# Patient Record
Sex: MALE | Race: WHITE | NOT HISPANIC OR LATINO | Employment: FULL TIME | URBAN - METROPOLITAN AREA
[De-identification: names, ages, dates, MRNs, and addresses within clinical notes are randomized per-mention and may not be internally consistent; named-entity substitution may affect disease eponyms.]

---

## 2019-07-10 ENCOUNTER — OFFICE VISIT (OUTPATIENT)
Dept: PODIATRY | Facility: CLINIC | Age: 49
End: 2019-07-10
Payer: COMMERCIAL

## 2019-07-10 VITALS
HEIGHT: 67 IN | WEIGHT: 181 LBS | SYSTOLIC BLOOD PRESSURE: 130 MMHG | BODY MASS INDEX: 28.41 KG/M2 | HEART RATE: 69 BPM | DIASTOLIC BLOOD PRESSURE: 84 MMHG

## 2019-07-10 DIAGNOSIS — D36.10 NEUROMA: Primary | ICD-10-CM

## 2019-07-10 DIAGNOSIS — M79.672 LEFT FOOT PAIN: ICD-10-CM

## 2019-07-10 PROCEDURE — 99213 PR OFFICE/OUTPT VISIT, EST, LEVL III, 20-29 MIN: ICD-10-PCS | Mod: 25,,, | Performed by: PODIATRIST

## 2019-07-10 PROCEDURE — 64455 NJX AA&/STRD PLTR COM DG NRV: CPT | Mod: LT,,, | Performed by: PODIATRIST

## 2019-07-10 PROCEDURE — 99213 OFFICE O/P EST LOW 20 MIN: CPT | Mod: 25,,, | Performed by: PODIATRIST

## 2019-07-10 PROCEDURE — 64455 PR INJECT ANES/STEROID PLANTAR COMMON DIGITAL NERVE: ICD-10-PCS | Mod: LT,,, | Performed by: PODIATRIST

## 2019-07-10 RX ORDER — DEXAMETHASONE SODIUM PHOSPHATE 4 MG/ML
4 INJECTION, SOLUTION INTRA-ARTICULAR; INTRALESIONAL; INTRAMUSCULAR; INTRAVENOUS; SOFT TISSUE
Status: CANCELLED | OUTPATIENT
Start: 2019-07-10 | End: 2019-07-10

## 2019-07-10 RX ORDER — BUPIVACAINE HYDROCHLORIDE 5 MG/ML
1.5 INJECTION, SOLUTION PERINEURAL
Status: COMPLETED | OUTPATIENT
Start: 2019-07-10 | End: 2019-07-10

## 2019-07-10 RX ORDER — NAPROXEN 500 MG/1
500 TABLET ORAL 2 TIMES DAILY
Qty: 60 TABLET | Refills: 1 | Status: SHIPPED | OUTPATIENT
Start: 2019-07-10 | End: 2019-09-08

## 2019-07-10 RX ORDER — BUPIVACAINE HYDROCHLORIDE 5 MG/ML
1.5 INJECTION, SOLUTION PERINEURAL
Status: CANCELLED | OUTPATIENT
Start: 2019-07-10 | End: 2019-07-10

## 2019-07-10 RX ORDER — DEXAMETHASONE SODIUM PHOSPHATE 4 MG/ML
4 INJECTION, SOLUTION INTRA-ARTICULAR; INTRALESIONAL; INTRAMUSCULAR; INTRAVENOUS; SOFT TISSUE
Status: COMPLETED | OUTPATIENT
Start: 2019-07-10 | End: 2019-07-10

## 2019-07-10 RX ORDER — METHYLPREDNISOLONE ACETATE 40 MG/ML
20 INJECTION, SUSPENSION INTRA-ARTICULAR; INTRALESIONAL; INTRAMUSCULAR; SOFT TISSUE
Status: COMPLETED | OUTPATIENT
Start: 2019-07-10 | End: 2019-07-10

## 2019-07-10 RX ORDER — METHYLPREDNISOLONE ACETATE 40 MG/ML
20 INJECTION, SUSPENSION INTRA-ARTICULAR; INTRALESIONAL; INTRAMUSCULAR; SOFT TISSUE
Status: CANCELLED | OUTPATIENT
Start: 2019-07-10 | End: 2019-07-10

## 2019-07-10 RX ADMIN — METHYLPREDNISOLONE ACETATE 20 MG: 40 INJECTION, SUSPENSION INTRA-ARTICULAR; INTRALESIONAL; INTRAMUSCULAR; SOFT TISSUE at 03:07

## 2019-07-10 RX ADMIN — DEXAMETHASONE SODIUM PHOSPHATE 4 MG: 4 INJECTION, SOLUTION INTRA-ARTICULAR; INTRALESIONAL; INTRAMUSCULAR; INTRAVENOUS; SOFT TISSUE at 03:07

## 2019-07-10 RX ADMIN — BUPIVACAINE HYDROCHLORIDE 7.5 MG: 5 INJECTION, SOLUTION PERINEURAL at 03:07

## 2019-07-10 NOTE — PROGRESS NOTES
"  [address] [facility] [address] [place] [address]  Phone: [phone]   [phone]    Patient's PCP:Jeyson Blount MD  Referring Provider: No ref. provider found    Subjective:      Chief Complaint:: No chief complaint on file.    HPI  Cory Musa is a 49 y.o. male who presents with a follow up for neuroma second IS left foot. Doing a little better.I have been icing it.Three weeks ago it was really bad but over the weekend it did better.Would like an injection. "I am starting a new job Monday and I will be on my feet a lot."    Vitals:    07/10/19 1505   BP: 130/84   Pulse: 69     Shoe Size:     Past Surgical History:   Procedure Laterality Date    FOOT SURGERY      left  excision mariscal's neuroma     Past Medical History:   Diagnosis Date    GERD (gastroesophageal reflux disease)     Hernia of unspecified site of abdominal cavity without mention of obstruction or gangrene     hiatal    Insomnia     Mariscal's neuroma      Family History   Problem Relation Age of Onset    Celiac disease Neg Hx     Cirrhosis Neg Hx     Colon cancer Neg Hx     Colon polyps Neg Hx     Crohn's disease Neg Hx     Cystic fibrosis Neg Hx     Esophageal cancer Neg Hx     Hemochromatosis Neg Hx     Inflammatory bowel disease Neg Hx     Irritable bowel syndrome Neg Hx     Liver cancer Neg Hx     Liver disease Neg Hx     Rectal cancer Neg Hx     Stomach cancer Neg Hx     Ulcerative colitis Neg Hx     Herminio's disease Neg Hx     Heart disease Brother 45        CAD        Social History:   Marital Status:   Alcohol History:  reports that he drinks about 0.6 oz of alcohol per week.  Tobacco History:  reports that he has never smoked. He does not have any smokeless tobacco history on file.  Drug History:  has no drug history on file.    Review of patient's allergies indicates:  No Known Allergies    Current Outpatient Medications   Medication Sig Dispense Refill    naproxen (NAPROSYN) 500 MG tablet Take 1 " tablet (500 mg total) by mouth 2 (two) times daily. 60 tablet 1    omeprazole (PRILOSEC) 40 MG capsule Take 1 capsule (40 mg total) by mouth every morning. 30 capsule 11     Current Facility-Administered Medications   Medication Dose Route Frequency Provider Last Rate Last Dose    bupivacaine 0.5% (5 mg/ml) injection 7.5 mg  1.5 mL Infiltration 1 time in Clinic/HOD Jagdeep Bustamante DPM        dexamethasone injection 4 mg  4 mg Other 1 time in Clinic/HOD Jagdeep Bustamante DPM        methylPREDNISolone acetate injection 20 mg  20 mg Other 1 time in Clinic/HOD Jagdeep Bustamante DPM           Review of Systems      Objective:        Physical Exam:   Foot Exam    General  General Appearance: appears stated age and healthy   Orientation: alert and oriented to person, place, and time   Affect: appropriate   Gait: unimpaired       Left Foot/Ankle      Inspection and Palpation  Ecchymosis: none  Tenderness: (2nd intermetatarsal space )  Swelling: none   Arch: normal  Hammertoes: absent  Claw toes: absent  Hallux valgus: no  Hallux limitus: no  Skin Exam: skin intact;     Neurovascular  Dorsalis pedis: 2+  Posterior tibial: 2+  Saphenous nerve sensation: normal  Tibial nerve sensation: normal  Superficial peroneal nerve sensation: normal  Deep peroneal nerve sensation: normal  Sural nerve sensation: normal    Muscle Strength  Ankle dorsiflexion: 5  Ankle plantar flexion: 5  Ankle inversion: 5  Ankle eversion: 5  Great toe extension: 5  Great toe flexion: 5    Comments  Tenderness with forefoot compression     Physical Exam   Cardiovascular:   Pulses:       Dorsalis pedis pulses are 2+ on the left side.        Posterior tibial pulses are 2+ on the left side.       Imaging: none            Assessment:       1. Neuroma    2. Left foot pain      Plan:   Neuroma  -     naproxen (NAPROSYN) 500 MG tablet; Take 1 tablet (500 mg total) by mouth 2 (two) times daily.  Dispense: 60 tablet; Refill: 1  -     methylPREDNISolone acetate injection  20 mg  -     bupivacaine 0.5% (5 mg/ml) injection 7.5 mg  -     dexamethasone injection 4 mg    Left foot pain  -     naproxen (NAPROSYN) 500 MG tablet; Take 1 tablet (500 mg total) by mouth 2 (two) times daily.  Dispense: 60 tablet; Refill: 1  -     methylPREDNISolone acetate injection 20 mg  -     bupivacaine 0.5% (5 mg/ml) injection 7.5 mg  -     dexamethasone injection 4 mg      Follow up if symptoms worsen or fail to improve.    Procedures - The area was prepped with alcohol, and a steroid injection was performed at left second intermetatarsal space using 1 1/2 cc of 0.5% Marcaine w/out epi, 1 cc Dexamethasone, 1/2 cc Methylprednisolone. Patient tolerated the procedure well.       Avoid barefoot  Recommend stiffer-soled shoes with more room in the toe box area  Ice to painful area 15 minutes at a time       Counseling:     I provided patient education verbally regarding:   Patient diagnosis, treatment options, as well as alternatives, risks, and benefits.     I counseled patient on causes and treatments for neuromas. Wearing tight or high-heeled shoes can cause a neuroma. Shoes that are too narrow or too pointed squeeze the bones in the ball of the foot. Shoes with high heels put extra pressure on the ends of the bones. When the bones are squeezed together, they pinch the nerve that runs between them. Taking off your shoes and rubbing the ball of your foot may decrease or relieve the pain. Recommend shoes with more room in the toe box. Sometimes steroid injection is necessary to alleviate symptoms. Discussed alcohol sclerosing injections as another option for conservative treatment.       This note was created using Dragon voice recognition software that occasionally misinterpreted phrases or words.

## 2019-07-10 NOTE — PATIENT INSTRUCTIONS
What Are Neuromas of the Foot?  The ball of your foot is the bottom part just behind your toes. Bands of tissue (ligaments) connect the bones in the ball of your foot. Nerves run between the bones and underneath the ligaments. When a nerve becomes pinched, this causes it to swell and become painful due to the thickening of the tissue that surrounds the nerve. The painful, swollen nerve is called a neuroma (also called Woodson's neuroma).     A neuroma most often occurs at the base of either the third and fourth toes or the second and third toes.   What causes a neuroma?  Wearing tight or high-heeled shoes can cause a neuroma. Shoes that are too narrow or too pointed squeeze the bones in the ball of the foot. Shoes with high heels put extra pressure on the ends of the bones. When the bones are squeezed together, they pinch the nerve that runs between them.  Symptoms  The most common symptom of a neuroma is pain in the ball of the foot between two toes. The pain may be dull or sharp. It may feel as if you have a stone in your shoe. You may also have tingling or numbness in one or both of the toes. Symptoms may occur after you have been walking or standing for a while. Taking off your shoes and rubbing the ball of your foot may decrease or relieve the pain.  Preventing future problems  To prevent a future neuroma, buy shoes with plenty of room across the ball of the foot and in the toes. This keeps the bones from being squeezed together. Wearing low-heeled shoes (less than 2 inches) also puts less pressure on the bones and nerves in the ball of the foot.   Date Last Reviewed: 9/10/2015  © 0649-0119 [employer]. [address] [place] [address], [place] [address]. All rights reserved. This information is not intended as a substitute for professional medical care. Always follow your healthcare professional's instructions.

## 2020-03-18 ENCOUNTER — HOSPITAL ENCOUNTER (OUTPATIENT)
Dept: RADIOLOGY | Facility: CLINIC | Age: 50
Discharge: HOME | End: 2020-03-18
Attending: PODIATRIST
Payer: COMMERCIAL

## 2020-03-18 ENCOUNTER — OFFICE VISIT (OUTPATIENT)
Dept: PODIATRY | Facility: CLINIC | Age: 50
End: 2020-03-18
Payer: COMMERCIAL

## 2020-03-18 VITALS — BODY MASS INDEX: 26.68 KG/M2 | HEART RATE: 86 BPM | RESPIRATION RATE: 18 BRPM | HEIGHT: 67 IN | WEIGHT: 170 LBS

## 2020-03-18 DIAGNOSIS — M76.71 PERONEAL TENDINITIS OF RIGHT LOWER EXTREMITY: Primary | ICD-10-CM

## 2020-03-18 DIAGNOSIS — M79.671 RIGHT FOOT PAIN: ICD-10-CM

## 2020-03-18 PROCEDURE — 73630 XR FOOT COMPLETE 3 VIEW RIGHT: ICD-10-PCS | Mod: RT,S$GLB,, | Performed by: PODIATRIST

## 2020-03-18 PROCEDURE — 73630 X-RAY EXAM OF FOOT: CPT | Mod: RT,S$GLB,, | Performed by: PODIATRIST

## 2020-03-18 PROCEDURE — 99213 PR OFFICE/OUTPT VISIT, EST, LEVL III, 20-29 MIN: ICD-10-PCS | Mod: 25,S$GLB,, | Performed by: PODIATRIST

## 2020-03-18 PROCEDURE — 99213 OFFICE O/P EST LOW 20 MIN: CPT | Mod: 25,S$GLB,, | Performed by: PODIATRIST

## 2020-03-18 RX ORDER — IBUPROFEN 800 MG/1
800 TABLET ORAL EVERY 8 HOURS PRN
COMMUNITY
Start: 2020-03-17 | End: 2020-10-05

## 2020-03-18 RX ORDER — NAPROXEN 500 MG/1
1 TABLET ORAL
COMMUNITY
Start: 2019-07-10 | End: 2020-07-01

## 2020-03-18 RX ORDER — METHYLPREDNISOLONE 4 MG/1
TABLET ORAL
Qty: 1 PACKAGE | Refills: 0 | Status: SHIPPED | OUTPATIENT
Start: 2020-03-18 | End: 2020-04-17

## 2020-03-18 NOTE — LETTER
March 18, 2020      [facility] - Podiatry  [address]  [facility] [address]  Phone: [phone]  Fax: [phone]       Patient: Cory Dkueluis   YOB: 1970  Date of Visit: 03/18/2020    To Whom It May Concern:    [facility]iz [facility] peroneal tendinitis of lower right extremity. He may return to work on 08/24/2020 with restrictions of cam walker boot cast. If you have any questions or concerns, or if I can be of further assistance, please do not hesitate to contact me.    Sincerely,    Electronically Signed by: RAEGAN Servin DPM

## 2020-03-18 NOTE — PATIENT INSTRUCTIONS
Tendonitis  A tendon is the thick fibrous cord that joins muscle to bone and allows joints to move. When a tendon becomes inflamed, it is called tendonitis. This can occur from overuse, injury, or infection. This usually involves the shoulders, forearm, wrist, hands and foot. Symptoms include pain, swelling and tenderness to the touch. Moving the joint increases the pain.  It takes 4 to 6 weeks for tendonitis to heal. It is treated by preventing motion of the tendon with a splint or brace and the use of anti-inflammatory medicine.  Home care  · Some people find relief with ice packs. These can be crushed or cubed ice in a plastic bag or a bag of frozen vegetables wrapped in a thin towel. Other people get better relief with heat. This can include a hot shower, hot bath, or a moist towel warmed in a microwave. Try each and use the method that feels best, for 15 to 20 minutes several times a day.  · Rest the inflamed joint and protect it from movement.  · You may use over-the-counter ibuprofen or naproxen to treat pain and inflammation, unless another medicine was prescribed. If you can't take these medicines, acetaminophen may help with the pain, but does not treat inflammation. If you have chronic liver or kidney disease or ever had a stomach ulcer or gastrointestinal bleeding, talk with your doctor before using these medicines.  · As your symptoms improve, begin gradual motion at the involved joint.  Follow-up care  Follow up with your healthcare provider if you are not improving after 5 days of treatment.  When to seek medical advice  Call your healthcare provider right away if any of these occur:  · Redness over the painful area  · Increasing pain or swelling at the joint  · Fever (1 degree above your normal temperature) lasting 24 to 48 hours Or, whatever your healthcare provider told you to report based on your condition  Date Last Reviewed: 11/21/2015  © 8982-2760 [employer]. [address] [place] [address]  Road, [place] [address]. All rights reserved. This information is not intended as a substitute for professional medical care. Always follow your healthcare professional's instructions.

## 2020-03-18 NOTE — LETTER
March 18, 2020      [facility] - Podiatry  [address] [facility] [address]  [facility] [address]  Phone: [phone]  Fax: [phone]       Patient: Cory Dukeluis   YOB: 1970  Date of Visit: 03/18/2020    To Whom It May Concern:    Luiz Musa  [facility] for peroneal tendinitis of lower right extremity. He may return to workl on 03/30/2020 with restrictions of cam walker boot cast. If you have any questions or concerns, or if I can be of further assistance, please do not hesitate to contact me.    Sincerely,    Electronically Signed by: RAEGAN Servin DPM

## 2020-03-18 NOTE — PROGRESS NOTES
[address] [facility] [address]  [place] [address]  Phone: [phone]   [phone]    Patient's PCP:Jeyson Blount MD  Referring Provider: No ref. provider found    Subjective:      Chief Complaint:: Foot Pain (right lateral aspect)    HPI  Cory Musa is a 50 y.o. male who presents with a complaint of pain and new lump to lateral aspect of right foot lasting for three days. Onset of the symptoms was inability to weight bear without sharp pain.  Current symptoms include throbbing pain at night sharp pain when walking of weight bearing, lump on outside of foot..  Aggravating factors are walking, weight bearing. Symptoms have remained.Treatment to date have included ice, elevation, ibuprofen. Patients rates pain 8/10 on pain scale.      Vitals:    03/18/20 1012   Pulse: 86   Resp: 18     Shoe Size: 9.5    Past Surgical History:   Procedure Laterality Date    FOOT SURGERY      left  excision mariscal's neuroma     Past Medical History:   Diagnosis Date    GERD (gastroesophageal reflux disease)     Hernia of unspecified site of abdominal cavity without mention of obstruction or gangrene     hiatal    Insomnia     Mariscal's neuroma      Family History   Problem Relation Age of Onset    Celiac disease Neg Hx     Cirrhosis Neg Hx     Colon cancer Neg Hx     Colon polyps Neg Hx     Crohn's disease Neg Hx     Cystic fibrosis Neg Hx     Esophageal cancer Neg Hx     Hemochromatosis Neg Hx     Inflammatory bowel disease Neg Hx     Irritable bowel syndrome Neg Hx     Liver cancer Neg Hx     Liver disease Neg Hx     Rectal cancer Neg Hx     Stomach cancer Neg Hx     Ulcerative colitis Neg Hx     Herminio's disease Neg Hx     Heart disease Brother 45        CAD        Social History:   Marital Status:   Alcohol History:  reports that he drinks about 1.0 standard drinks of alcohol per week.  Tobacco History:  reports that he has never smoked. He does not have any smokeless tobacco history on  file.  Drug History:  has no drug history on file.    Review of patient's allergies indicates:  No Known Allergies    Current Outpatient Medications   Medication Sig Dispense Refill    ibuprofen (ADVIL,MOTRIN) 800 MG tablet Take 800 mg by mouth every 8 (eight) hours as needed.      omeprazole (PRILOSEC) 40 MG capsule Take 1 capsule (40 mg total) by mouth every morning. 30 capsule 11    methylPREDNISolone (MEDROL DOSEPACK) 4 mg tablet use as directed 1 Package 0    naproxen (NAPROSYN) 500 MG tablet 1 tablet.       No current facility-administered medications for this visit.        Review of Systems      Objective:        Physical Exam:   Foot Exam    General  General Appearance: appears stated age and healthy   Orientation: alert and oriented to person, place, and time   Affect: appropriate   Gait: antalgic   Assistance: cane use       Right Foot/Ankle     Inspection and Palpation  Ecchymosis: none  Tenderness: bony tenderness (Tenderness to palpation at the 5th metatarsal base)  Swelling: fifth metatarsal base   Arch: normal  Skin Exam: skin intact;     Neurovascular  Dorsalis pedis: 2+  Posterior tibial: 2+  Saphenous nerve sensation: normal  Tibial nerve sensation: normal  Superficial peroneal nerve sensation: normal  Deep peroneal nerve sensation: normal  Sural nerve sensation: normal    Muscle Strength  Ankle dorsiflexion: 5  Ankle plantar flexion: 5  Ankle inversion: 5  Ankle eversion: 5  Great toe extension: 5  Great toe flexion: 5    Range of Motion    Passive  Ankle eversion: pain  Ankle inversion: pain      Tests  Anterior drawer: negative   Calcaneal squeeze: negative   Talar tilt: negative   PT Tinel's sign: negative            Physical Exam   Cardiovascular:   Pulses:       Dorsalis pedis pulses are 2+ on the right side.        Posterior tibial pulses are 2+ on the right side.   Musculoskeletal:        Right foot: There is bony tenderness.       Imaging: X-ray 3 views of the right foot were taken AP,  lateral, and oblique, weight bearing showing:  No fractures or dislocations noted.  No bone tumors or soft tissue masses.  Joint spaces are well preserved.      Electronically Signed by: Jagdeep Bustamante DPM             Assessment:       1. Peroneal tendinitis of right lower extremity    2. Right foot pain      Plan:   Peroneal tendinitis of right lower extremity  -     methylPREDNISolone (MEDROL DOSEPACK) 4 mg tablet; use as directed  Dispense: 1 Package; Refill: 0  -     AIR CAST WALKER BOOT FOR HOME USE    Right foot pain  -     X-Ray Foot Complete Right  -     methylPREDNISolone (MEDROL DOSEPACK) 4 mg tablet; use as directed  Dispense: 1 Package; Refill: 0  -     AIR CAST WALKER BOOT FOR HOME USE      Follow up in about 4 weeks (around 4/15/2020), or if symptoms worsen or fail to improve.    Procedures - None    CAM walker boot with weight bearing  Ice to painful area, 15 minutes at a time  Ace-wrap to help with swelling  No barefoot   Keep affected extremity elevated while seated      Counseling:     I provided patient education verbally regarding:   Patient diagnosis, treatment options, as well as alternatives, risks, and benefits.     Treatment of tendonitis with rest, ice, oral NSAID, topical antiinflammatory creams, cam walker boot if needed, and MRI if needed.      This note was created using Dragon voice recognition software that occasionally misinterpreted phrases or words.                6

## 2020-04-17 ENCOUNTER — OFFICE VISIT (OUTPATIENT)
Dept: PODIATRY | Facility: CLINIC | Age: 50
End: 2020-04-17
Payer: COMMERCIAL

## 2020-04-17 VITALS
TEMPERATURE: 96 F | DIASTOLIC BLOOD PRESSURE: 91 MMHG | BODY MASS INDEX: 26.63 KG/M2 | HEIGHT: 67 IN | SYSTOLIC BLOOD PRESSURE: 127 MMHG | HEART RATE: 87 BPM

## 2020-04-17 DIAGNOSIS — M79.672 LEFT FOOT PAIN: ICD-10-CM

## 2020-04-17 DIAGNOSIS — D36.10 NEUROMA: Primary | ICD-10-CM

## 2020-04-17 PROCEDURE — 64455 NJX AA&/STRD PLTR COM DG NRV: CPT | Mod: LT,S$GLB,, | Performed by: PODIATRIST

## 2020-04-17 PROCEDURE — 99213 PR OFFICE/OUTPT VISIT, EST, LEVL III, 20-29 MIN: ICD-10-PCS | Mod: 25,S$GLB,, | Performed by: PODIATRIST

## 2020-04-17 PROCEDURE — 99213 OFFICE O/P EST LOW 20 MIN: CPT | Mod: 25,S$GLB,, | Performed by: PODIATRIST

## 2020-04-17 PROCEDURE — 64455 PR INJECT ANES/STEROID PLANTAR COMMON DIGITAL NERVE: ICD-10-PCS | Mod: LT,S$GLB,, | Performed by: PODIATRIST

## 2020-04-17 RX ORDER — BUPIVACAINE HYDROCHLORIDE 5 MG/ML
1.5 INJECTION, SOLUTION PERINEURAL
Status: COMPLETED | OUTPATIENT
Start: 2020-04-17 | End: 2020-04-17

## 2020-04-17 RX ORDER — DEXAMETHASONE SODIUM PHOSPHATE 4 MG/ML
4 INJECTION, SOLUTION INTRA-ARTICULAR; INTRALESIONAL; INTRAMUSCULAR; INTRAVENOUS; SOFT TISSUE
Status: COMPLETED | OUTPATIENT
Start: 2020-04-17 | End: 2020-04-17

## 2020-04-17 RX ORDER — METHYLPREDNISOLONE ACETATE 40 MG/ML
20 INJECTION, SUSPENSION INTRA-ARTICULAR; INTRALESIONAL; INTRAMUSCULAR; SOFT TISSUE
Status: COMPLETED | OUTPATIENT
Start: 2020-04-17 | End: 2020-04-17

## 2020-04-17 RX ADMIN — METHYLPREDNISOLONE ACETATE 20 MG: 40 INJECTION, SUSPENSION INTRA-ARTICULAR; INTRALESIONAL; INTRAMUSCULAR; SOFT TISSUE at 08:04

## 2020-04-17 RX ADMIN — DEXAMETHASONE SODIUM PHOSPHATE 4 MG: 4 INJECTION, SOLUTION INTRA-ARTICULAR; INTRALESIONAL; INTRAMUSCULAR; INTRAVENOUS; SOFT TISSUE at 08:04

## 2020-04-17 RX ADMIN — BUPIVACAINE HYDROCHLORIDE 7.5 MG: 5 INJECTION, SOLUTION PERINEURAL at 08:04

## 2020-04-17 NOTE — LETTER
April 17, 2020      [facility] - Podiatry  [address] [place] [address]  Phone: [phone]  Fax: [phone]       Patient: Cory Musa   YOB: 1970  Date of Visit: 04/17/2020    To Whom It May Concern:    Luiz Musa was at our office on 04/17/2020 for neuroma of left foot. Please excuse patient from work starting 04/16/20-04/22/20.  He will return to work Thursday, 04/23/20. If you have any questions or concerns, or if I can be of further assistance, please do not hesitate to contact me.    Sincerely,    Electronically Signed by: RAEGAN Servin MA

## 2020-04-17 NOTE — PATIENT INSTRUCTIONS
What Are Neuromas of the Foot?  The ball of your foot is the bottom part just behind your toes. Bands of tissue (ligaments) connect the bones in the ball of your foot. Nerves run between the bones and underneath the ligaments. When a nerve becomes pinched, this causes it to swell and become painful due to the thickening of the tissue that surrounds the nerve. The painful, swollen nerve is called a neuroma (also called Woodson's neuroma).     A neuroma most often occurs at the base of either the third and fourth toes or the second and third toes.   What causes a neuroma?  Wearing tight or high-heeled shoes can cause a neuroma. Shoes that are too narrow or too pointed squeeze the bones in the ball of the foot. Shoes with high heels put extra pressure on the ends of the bones. When the bones are squeezed together, they pinch the nerve that runs between them.  Symptoms  The most common symptom of a neuroma is pain in the ball of the foot between two toes. The pain may be dull or sharp. It may feel as if you have a stone in your shoe. You may also have tingling or numbness in one or both of the toes. Symptoms may occur after you have been walking or standing for a while. Taking off your shoes and rubbing the ball of your foot may decrease or relieve the pain.  Preventing future problems  To prevent a future neuroma, buy shoes with plenty of room across the ball of the foot and in the toes. This keeps the bones from being squeezed together. Wearing low-heeled shoes (less than 2 inches) also puts less pressure on the bones and nerves in the ball of the foot.   Date Last Reviewed: 9/10/2015  © 3691-5375 [employer]. [address] [place] [address], [place] [address]. All rights reserved. This information is not intended as a substitute for professional medical care. Always follow your healthcare professional's instructions.

## 2020-04-17 NOTE — PROGRESS NOTES
[address] [facility] [address] [place] [address]  Phone: [phone]   [phone]    Patient's PCP:Jeyson Blount MD  Referring Provider: No ref. provider found    Subjective:      Chief Complaint:: Follow-up (left foot neuroma)    [facility]  Cory Musa is a 50 y.o. male who presents to clinic to follow-up for left foot neuroma.  Started getting really bad within the past 3 weeks. When experiencing pain, feels burning and at night feels like electricity.  Injection in 07/2019 did help and lasted for about 6 months. Wanting to get another injection. Also requesting refill on Naprosyn.  Patient has started taking a supplement for neuropathy and left foot pain has gotten better since.      Vitals:    04/17/20 0840   BP: (!) 127/91   Pulse: 87   Temp: 96.4 °F (35.8 °C)     Shoe Size:     Past Surgical History:   Procedure Laterality Date    FOOT SURGERY      left  excision mariscal's neuroma     Past Medical History:   Diagnosis Date    GERD (gastroesophageal reflux disease)     Hernia of unspecified site of abdominal cavity without mention of obstruction or gangrene     hiatal    Insomnia     Mariscal's neuroma      Family History   Problem Relation Age of Onset    Celiac disease Neg Hx     Cirrhosis Neg Hx     Colon cancer Neg Hx     Colon polyps Neg Hx     Crohn's disease Neg Hx     Cystic fibrosis Neg Hx     Esophageal cancer Neg Hx     Hemochromatosis Neg Hx     Inflammatory bowel disease Neg Hx     Irritable bowel syndrome Neg Hx     Liver cancer Neg Hx     Liver disease Neg Hx     Rectal cancer Neg Hx     Stomach cancer Neg Hx     Ulcerative colitis Neg Hx     Herminio's disease Neg Hx     Heart disease Brother 45        CAD        Social History:   Marital Status:   Alcohol History:  reports that he drinks about 1.0 standard drinks of alcohol per week.  Tobacco History:  reports that he has never smoked. He does not have any smokeless tobacco history on file.  Drug History:  has no  drug history on file.    Review of patient's allergies indicates:  No Known Allergies    Current Outpatient Medications   Medication Sig Dispense Refill    ibuprofen (ADVIL,MOTRIN) 800 MG tablet Take 800 mg by mouth every 8 (eight) hours as needed.      omeprazole (PRILOSEC) 40 MG capsule Take 1 capsule (40 mg total) by mouth every morning. 30 capsule 11    naproxen (NAPROSYN) 500 MG tablet 1 tablet.       No current facility-administered medications for this visit.        Review of Systems      Objective:        Physical Exam:   Foot Exam    General  General Appearance: appears stated age and healthy   Orientation: alert and oriented to person, place, and time   Affect: appropriate   Gait: unimpaired       Left Foot/Ankle      Inspection and Palpation  Ecchymosis: none  Tenderness: (2nd intermetatarsal space )  Swelling: none   Arch: normal  Hammertoes: absent  Claw toes: absent  Hallux valgus: no  Hallux limitus: no  Skin Exam: skin intact;     Neurovascular  Dorsalis pedis: 2+  Posterior tibial: 2+  Saphenous nerve sensation: normal  Tibial nerve sensation: normal  Superficial peroneal nerve sensation: normal  Deep peroneal nerve sensation: normal  Sural nerve sensation: normal    Muscle Strength  Ankle dorsiflexion: 5  Ankle plantar flexion: 5  Ankle inversion: 5  Ankle eversion: 5  Great toe extension: 5  Great toe flexion: 5    Comments  Tenderness with forefoot compression     Physical Exam   Cardiovascular:   Pulses:       Dorsalis pedis pulses are 2+ on the left side.        Posterior tibial pulses are 2+ on the left side.       Imaging: none            Assessment:       1. Neuroma    2. Left foot pain      Plan:   Neuroma  -     methylPREDNISolone acetate injection 20 mg  -     bupivacaine injection 7.5 mg  -     dexamethasone injection 4 mg    Left foot pain  -     methylPREDNISolone acetate injection 20 mg  -     bupivacaine injection 7.5 mg  -     dexamethasone injection 4 mg      Follow up if  symptoms worsen or fail to improve.    Procedures - The area was prepped with alcohol, and a steroid injection was performed at left 2nd intermetatarsal space using 1 1/2 cc of 0.5% Marcaine w/out epi, 1 cc Dexamethasone, 1/2 cc Methylprednisolone. Patient tolerated the procedure well.         Counseling:     I provided patient education verbally regarding:   Patient diagnosis, treatment options, as well as alternatives, risks, and benefits.     I counseled patient on causes and treatments for neuromas. Wearing tight or high-heeled shoes can cause a neuroma. Shoes that are too narrow or too pointed squeeze the bones in the ball of the foot. Shoes with high heels put extra pressure on the ends of the bones. When the bones are squeezed together, they pinch the nerve that runs between them. Taking off your shoes and rubbing the ball of your foot may decrease or relieve the pain. Recommend shoes with more room in the toe box. Sometimes steroid injection is necessary to alleviate symptoms. Discussed alcohol sclerosing injections as another option for conservative treatment.     This note was created using Dragon voice recognition software that occasionally misinterpreted phrases or words.

## 2020-07-01 ENCOUNTER — OFFICE VISIT (OUTPATIENT)
Dept: PODIATRY | Facility: CLINIC | Age: 50
End: 2020-07-01
Payer: COMMERCIAL

## 2020-07-01 VITALS
WEIGHT: 170 LBS | SYSTOLIC BLOOD PRESSURE: 120 MMHG | HEIGHT: 67 IN | TEMPERATURE: 98 F | DIASTOLIC BLOOD PRESSURE: 85 MMHG | HEART RATE: 80 BPM | BODY MASS INDEX: 26.68 KG/M2

## 2020-07-01 DIAGNOSIS — M79.671 RIGHT FOOT PAIN: ICD-10-CM

## 2020-07-01 DIAGNOSIS — M76.71 PERONEAL TENDINITIS OF RIGHT LOWER EXTREMITY: Primary | ICD-10-CM

## 2020-07-01 PROCEDURE — 99213 OFFICE O/P EST LOW 20 MIN: CPT | Mod: S$GLB,,, | Performed by: PODIATRIST

## 2020-07-01 PROCEDURE — 99213 PR OFFICE/OUTPT VISIT, EST, LEVL III, 20-29 MIN: ICD-10-PCS | Mod: S$GLB,,, | Performed by: PODIATRIST

## 2020-07-01 RX ORDER — METHYLPREDNISOLONE 4 MG/1
TABLET ORAL
Qty: 1 PACKAGE | Refills: 0 | Status: SHIPPED | OUTPATIENT
Start: 2020-07-01 | End: 2020-09-17

## 2020-07-01 RX ORDER — NAPROXEN 500 MG/1
500 TABLET ORAL 2 TIMES DAILY
Qty: 30 TABLET | Refills: 1 | Status: SHIPPED | OUTPATIENT
Start: 2020-07-01

## 2020-07-01 NOTE — PROGRESS NOTES
"  [address] [facility] [address] [place] [address]  Phone: [phone]   [phone]    Patient's PCP:Jeyson Blount MD  Referring Provider: No ref. provider found    Subjective:      Chief Complaint:: Follow-up (peroneal tendonitis right)    HPI  Cory Musa is a 50 y.o. male who presents with a complaint of  Peroneal tendonits right  lasting for two weeks. Onset of the symptoms was "not sure but no trauma."  Current symptoms include pain.  Aggravating factors are walking. Symptoms have progresed.Treatment to date have included running shoes, icing in the evening and Ibuprofen. Patients rates pain 5/10 on pain scale.        Vitals:    07/01/20 1444   BP: 120/85   Pulse: 80   Temp: 97.7 °F (36.5 °C)     Shoe Size: 9.5    Past Surgical History:   Procedure Laterality Date    FOOT SURGERY      left  excision mariscal's neuroma     Past Medical History:   Diagnosis Date    GERD (gastroesophageal reflux disease)     Hernia of unspecified site of abdominal cavity without mention of obstruction or gangrene     hiatal    Insomnia     Mariscal's neuroma      Family History   Problem Relation Age of Onset    Celiac disease Neg Hx     Cirrhosis Neg Hx     Colon cancer Neg Hx     Colon polyps Neg Hx     Crohn's disease Neg Hx     Cystic fibrosis Neg Hx     Esophageal cancer Neg Hx     Hemochromatosis Neg Hx     Inflammatory bowel disease Neg Hx     Irritable bowel syndrome Neg Hx     Liver cancer Neg Hx     Liver disease Neg Hx     Rectal cancer Neg Hx     Stomach cancer Neg Hx     Ulcerative colitis Neg Hx     Herminio's disease Neg Hx     Heart disease Brother 45        CAD        Social History:   Marital Status:   Alcohol History:  reports current alcohol use of about 1.0 standard drinks of alcohol per week.  Tobacco History:  reports that he has never smoked. He does not have any smokeless tobacco history on file.  Drug History:  has no history on file for drug.    Review of patient's " allergies indicates:  No Known Allergies    Current Outpatient Medications   Medication Sig Dispense Refill    omeprazole (PRILOSEC) 40 MG capsule Take 1 capsule (40 mg total) by mouth every morning. 30 capsule 11    ibuprofen (ADVIL,MOTRIN) 800 MG tablet Take 800 mg by mouth every 8 (eight) hours as needed.      methylPREDNISolone (MEDROL DOSEPACK) 4 mg tablet use as directed 1 Package 0    naproxen (NAPROSYN) 500 MG tablet Take 1 tablet (500 mg total) by mouth 2 (two) times daily. 30 tablet 1     No current facility-administered medications for this visit.        Review of Systems      Objective:        Physical Exam:   Foot Exam    General  General Appearance: appears stated age and healthy   Orientation: alert and oriented to person, place, and time   Affect: appropriate   Gait: antalgic   Assistance: cane use       Right Foot/Ankle     Inspection and Palpation  Ecchymosis: none  Tenderness: bony tenderness (Tenderness to palpation at the 5th metatarsal base)  Swelling: fifth metatarsal base   Arch: normal  Skin Exam: skin intact;     Neurovascular  Dorsalis pedis: 2+  Posterior tibial: 2+  Saphenous nerve sensation: normal  Tibial nerve sensation: normal  Superficial peroneal nerve sensation: normal  Deep peroneal nerve sensation: normal  Sural nerve sensation: normal    Muscle Strength  Ankle dorsiflexion: 5  Ankle plantar flexion: 5  Ankle inversion: 5  Ankle eversion: 5  Great toe extension: 5  Great toe flexion: 5    Range of Motion    Passive  Ankle eversion: pain  Ankle inversion: pain      Tests  Anterior drawer: negative   Calcaneal squeeze: negative   Talar tilt: negative   PT Tinel's sign: negative            Physical Exam   Cardiovascular:   Pulses:       Dorsalis pedis pulses are 2+ on the right side.        Posterior tibial pulses are 2+ on the right side.   Musculoskeletal:      Right foot: Bony tenderness present.       Imaging: none            Assessment:       1. Peroneal tendinitis of  right lower extremity    2. Right foot pain      Plan:   Peroneal tendinitis of right lower extremity  -     naproxen (NAPROSYN) 500 MG tablet; Take 1 tablet (500 mg total) by mouth 2 (two) times daily.  Dispense: 30 tablet; Refill: 1  -     methylPREDNISolone (MEDROL DOSEPACK) 4 mg tablet; use as directed  Dispense: 1 Package; Refill: 0    Right foot pain  -     naproxen (NAPROSYN) 500 MG tablet; Take 1 tablet (500 mg total) by mouth 2 (two) times daily.  Dispense: 30 tablet; Refill: 1  -     methylPREDNISolone (MEDROL DOSEPACK) 4 mg tablet; use as directed  Dispense: 1 Package; Refill: 0      Follow up in about 4 weeks (around 7/29/2020), or if symptoms worsen or fail to improve.    Procedures - None    CAM walker boot with weight bearing  Ice to painful area, 15 minutes at a time  Ace-wrap to help with swelling  No barefoot   Keep affected extremity elevated while seated      Counseling:     I provided patient education verbally regarding:   Patient diagnosis, treatment options, as well as alternatives, risks, and benefits.     Treatment of tendonitis with rest, ice, oral NSAID, topical antiinflammatory creams, cam walker boot if needed, and MRI if needed.      This note was created using Dragon voice recognition software that occasionally misinterpreted phrases or words.

## 2020-07-01 NOTE — LETTER
July 1, 2020      [facility] - Podiatry  [address] [place] [address]  Phone: [phone]  Fax: [phone]       Patient: Cory Musa   YOB: 1970  Date of Visit: 07/01/2020    To Whom It May Concern:     Luiz Musa was at our office on 07/01/2020. Please excuse patient from work starting 07/01/20 - 08/03/20 due to his diagnosis of peroneal tendinitis of right lower extremity. If you have any questions or concerns, or if I can be of further assistance, please do not hesitate to contact me.    Sincerely,  Electronically Signed by: RAEGAN Servin MA

## 2020-09-17 ENCOUNTER — OFFICE VISIT (OUTPATIENT)
Dept: PODIATRY | Facility: CLINIC | Age: 50
End: 2020-09-17
Payer: COMMERCIAL

## 2020-09-17 VITALS
TEMPERATURE: 98 F | HEART RATE: 80 BPM | BODY MASS INDEX: 26.63 KG/M2 | SYSTOLIC BLOOD PRESSURE: 128 MMHG | HEIGHT: 67 IN | DIASTOLIC BLOOD PRESSURE: 88 MMHG

## 2020-09-17 DIAGNOSIS — M79.672 LEFT FOOT PAIN: ICD-10-CM

## 2020-09-17 DIAGNOSIS — D36.10 NEUROMA: Primary | ICD-10-CM

## 2020-09-17 PROCEDURE — 99213 OFFICE O/P EST LOW 20 MIN: CPT | Mod: 25,S$GLB,, | Performed by: PODIATRIST

## 2020-09-17 PROCEDURE — 64455 NJX AA&/STRD PLTR COM DG NRV: CPT | Mod: LT,S$GLB,, | Performed by: PODIATRIST

## 2020-09-17 PROCEDURE — 64455 PR INJECT ANES/STEROID PLANTAR COMMON DIGITAL NERVE: ICD-10-PCS | Mod: LT,S$GLB,, | Performed by: PODIATRIST

## 2020-09-17 PROCEDURE — 99213 PR OFFICE/OUTPT VISIT, EST, LEVL III, 20-29 MIN: ICD-10-PCS | Mod: 25,S$GLB,, | Performed by: PODIATRIST

## 2020-09-17 RX ORDER — METHYLPREDNISOLONE ACETATE 40 MG/ML
20 INJECTION, SUSPENSION INTRA-ARTICULAR; INTRALESIONAL; INTRAMUSCULAR; SOFT TISSUE
Status: COMPLETED | OUTPATIENT
Start: 2020-09-17 | End: 2020-09-17

## 2020-09-17 RX ORDER — DEXAMETHASONE SODIUM PHOSPHATE 4 MG/ML
4 INJECTION, SOLUTION INTRA-ARTICULAR; INTRALESIONAL; INTRAMUSCULAR; INTRAVENOUS; SOFT TISSUE
Status: COMPLETED | OUTPATIENT
Start: 2020-09-17 | End: 2020-09-17

## 2020-09-17 RX ORDER — BUPIVACAINE HYDROCHLORIDE 5 MG/ML
1.5 INJECTION, SOLUTION PERINEURAL
Status: COMPLETED | OUTPATIENT
Start: 2020-09-17 | End: 2020-09-17

## 2020-09-17 RX ADMIN — DEXAMETHASONE SODIUM PHOSPHATE 4 MG: 4 INJECTION, SOLUTION INTRA-ARTICULAR; INTRALESIONAL; INTRAMUSCULAR; INTRAVENOUS; SOFT TISSUE at 04:09

## 2020-09-17 RX ADMIN — BUPIVACAINE HYDROCHLORIDE 7.5 MG: 5 INJECTION, SOLUTION PERINEURAL at 04:09

## 2020-09-17 RX ADMIN — METHYLPREDNISOLONE ACETATE 20 MG: 40 INJECTION, SUSPENSION INTRA-ARTICULAR; INTRALESIONAL; INTRAMUSCULAR; SOFT TISSUE at 04:09

## 2020-09-17 NOTE — PROGRESS NOTES
[address] [facility] [address] [place] [address]  Phone: [phone]   [phone]    Patient's PCP:Jeyson Blount MD  Referring Provider: No ref. provider found    Subjective:      Chief Complaint:: Follow-up (neuroma left foot)    [facility]  Cory Musa is a 50 y.o. male who presents for neuroma left foot. Patient has received cortisone injections that have helped for a few months. Pain started back up Monday while at work. Pain 7/10 on a pain scale.     Vitals:    09/17/20 1538   BP: 128/88   Pulse: 80   Temp: 98.2 °F (36.8 °C)     Shoe Size:     Past Surgical History:   Procedure Laterality Date    FOOT SURGERY      left  excision mariscal's neuroma     Past Medical History:   Diagnosis Date    GERD (gastroesophageal reflux disease)     Hernia of unspecified site of abdominal cavity without mention of obstruction or gangrene     hiatal    Insomnia     Mariscal's neuroma      Family History   Problem Relation Age of Onset    Celiac disease Neg Hx     Cirrhosis Neg Hx     Colon cancer Neg Hx     Colon polyps Neg Hx     Crohn's disease Neg Hx     Cystic fibrosis Neg Hx     Esophageal cancer Neg Hx     Hemochromatosis Neg Hx     Inflammatory bowel disease Neg Hx     Irritable bowel syndrome Neg Hx     Liver cancer Neg Hx     Liver disease Neg Hx     Rectal cancer Neg Hx     Stomach cancer Neg Hx     Ulcerative colitis Neg Hx     Herminio's disease Neg Hx     Heart disease Brother 45        CAD        Social History:   Marital Status:   Alcohol History:  reports current alcohol use of about 1.0 standard drinks of alcohol per week.  Tobacco History:  reports that he has never smoked. He does not have any smokeless tobacco history on file.  Drug History:  has no history on file for drug.    Review of patient's allergies indicates:  No Known Allergies    Current Outpatient Medications   Medication Sig Dispense Refill    omeprazole (PRILOSEC) 40 MG capsule Take 1 capsule (40 mg total) by  mouth every morning. 30 capsule 11    ibuprofen (ADVIL,MOTRIN) 800 MG tablet Take 800 mg by mouth every 8 (eight) hours as needed.      naproxen (NAPROSYN) 500 MG tablet Take 1 tablet (500 mg total) by mouth 2 (two) times daily. (Patient not taking: Reported on 9/17/2020) 30 tablet 1     No current facility-administered medications for this visit.        Review of Systems      Objective:        Physical Exam:   Foot Exam    General  General Appearance: appears stated age and healthy   Orientation: alert and oriented to person, place, and time   Affect: appropriate   Gait: unimpaired       Left Foot/Ankle      Inspection and Palpation  Ecchymosis: none  Tenderness: (2nd intermetatarsal space )  Swelling: (Dorsal 2nd intermetatarsal space)  Arch: normal  Hammertoes: absent  Claw toes: absent  Hallux valgus: no  Hallux limitus: no  Skin Exam: skin intact;     Neurovascular  Dorsalis pedis: 2+  Posterior tibial: 2+  Saphenous nerve sensation: normal  Tibial nerve sensation: normal  Superficial peroneal nerve sensation: normal  Deep peroneal nerve sensation: normal  Sural nerve sensation: normal    Muscle Strength  Ankle dorsiflexion: 5  Ankle plantar flexion: 5  Ankle inversion: 5  Ankle eversion: 5  Great toe extension: 5  Great toe flexion: 5    Comments  Tenderness with forefoot compression     Physical Exam   Cardiovascular:   Pulses:       Dorsalis pedis pulses are 2+ on the left side.        Posterior tibial pulses are 2+ on the left side.   Musculoskeletal:      Left foot: No bunion.       Imaging: none            Assessment:       1. Neuroma    2. Left foot pain      Plan:   Neuroma  -     methylPREDNISolone acetate injection 20 mg  -     bupivacaine injection 7.5 mg  -     dexamethasone injection 4 mg    Left foot pain  -     methylPREDNISolone acetate injection 20 mg  -     bupivacaine injection 7.5 mg  -     dexamethasone injection 4 mg      Follow up if symptoms worsen or fail to improve.    Procedures -  The area was prepped with alcohol, and a steroid injection was performed at left 2nd intermetatarsal space using 1 1/2 cc of 0.5% Marcaine w/out epi, 1 cc Dexamethasone, 1/2 cc Methylprednisolone. Patient tolerated the procedure well.         Counseling:     I provided patient education verbally regarding:   Patient diagnosis, treatment options, as well as alternatives, risks, and benefits.     I counseled patient on causes and treatments for neuromas. Wearing tight or high-heeled shoes can cause a neuroma. Shoes that are too narrow or too pointed squeeze the bones in the ball of the foot. Shoes with high heels put extra pressure on the ends of the bones. When the bones are squeezed together, they pinch the nerve that runs between them. Taking off your shoes and rubbing the ball of your foot may decrease or relieve the pain. Recommend shoes with more room in the toe box. Sometimes steroid injection is necessary to alleviate symptoms. Discussed alcohol sclerosing injections as another option for conservative treatment.       This note was created using Dragon voice recognition software that occasionally misinterpreted phrases or words.

## 2020-09-17 NOTE — LETTER
September 17, 2020      [facility] - Podiatry  [address] [address]  Phone: [phone]  Fax: [phone]       Patient: Cory Musa   YOB: 1970  Date of Visit: 09/17/2020    To Whom It May Concern:    Luiz Musa was at our office on 09/17/2020 for neuroma of left foot. Please excuse patient from work starting 09/15-09/20. Patient will return to work Monday, 09/21. If you have any questions or concerns, or if I can be of further assistance, please do not hesitate to contact me.    Sincerely,  Electronically Signed by: RAEGAN Servin MA

## 2020-09-18 DIAGNOSIS — M76.71 PERONEAL TENDINITIS OF RIGHT LOWER EXTREMITY: Primary | ICD-10-CM

## 2020-09-18 RX ORDER — NAPROXEN 500 MG/1
500 TABLET ORAL 2 TIMES DAILY
Qty: 30 TABLET | Refills: 1 | Status: SHIPPED | OUTPATIENT
Start: 2020-09-18 | End: 2020-09-18 | Stop reason: CLARIF

## 2020-09-18 RX ORDER — IBUPROFEN 800 MG/1
800 TABLET ORAL 3 TIMES DAILY
Qty: 30 TABLET | Refills: 1 | Status: SHIPPED | OUTPATIENT
Start: 2020-09-18 | End: 2020-10-08

## 2020-09-29 ENCOUNTER — OFFICE VISIT (OUTPATIENT)
Dept: PODIATRY | Facility: CLINIC | Age: 50
End: 2020-09-29
Payer: COMMERCIAL

## 2020-09-29 VITALS
TEMPERATURE: 99 F | BODY MASS INDEX: 26.68 KG/M2 | HEIGHT: 67 IN | HEART RATE: 80 BPM | DIASTOLIC BLOOD PRESSURE: 88 MMHG | WEIGHT: 170 LBS | SYSTOLIC BLOOD PRESSURE: 128 MMHG

## 2020-09-29 DIAGNOSIS — M79.672 LEFT FOOT PAIN: ICD-10-CM

## 2020-09-29 DIAGNOSIS — Z01.818 PRE-OP TESTING: ICD-10-CM

## 2020-09-29 DIAGNOSIS — D36.10: ICD-10-CM

## 2020-09-29 DIAGNOSIS — D36.10 NEUROMA: Primary | ICD-10-CM

## 2020-09-29 PROCEDURE — 99213 OFFICE O/P EST LOW 20 MIN: CPT | Mod: S$GLB,,, | Performed by: PODIATRIST

## 2020-09-29 PROCEDURE — 99213 PR OFFICE/OUTPT VISIT, EST, LEVL III, 20-29 MIN: ICD-10-PCS | Mod: S$GLB,,, | Performed by: PODIATRIST

## 2020-09-29 RX ORDER — SODIUM CHLORIDE 0.9 % (FLUSH) 0.9 %
10 SYRINGE (ML) INJECTION
Status: CANCELLED | OUTPATIENT
Start: 2020-09-29

## 2020-09-29 NOTE — PROGRESS NOTES
[address] [facility] [address] [place] [address]  Phone: [phone]   [phone]    Patient's PCP:Jeyson Blount MD  Referring Provider: No ref. provider found    Subjective:      Chief Complaint:: Consult (neuroma left foot-)    HPI  Cory Dimple is a 50 y.o. male who presents today to discuss surgery for neuroma left foot second IS. Pain scale 3/10. Patient has had several injections, ice and nsaids with no relief.  He states he will we had 3 days of relief following his last steroid injection approximately 2 weeks ago.      Vitals:    09/29/20 1400   BP: 128/88   Pulse: 80   Temp: 98.7 °F (37.1 °C)     Shoe Size: 9.5    Past Surgical History:   Procedure Laterality Date    FOOT SURGERY      left  excision loida's neuroma     Past Medical History:   Diagnosis Date    GERD (gastroesophageal reflux disease)     Hernia of unspecified site of abdominal cavity without mention of obstruction or gangrene     hiatal    Insomnia     Woodson's neuroma      Family History   Problem Relation Age of Onset    Celiac disease Neg Hx     Cirrhosis Neg Hx     Colon cancer Neg Hx     Colon polyps Neg Hx     Crohn's disease Neg Hx     Cystic fibrosis Neg Hx     Esophageal cancer Neg Hx     Hemochromatosis Neg Hx     Inflammatory bowel disease Neg Hx     Irritable bowel syndrome Neg Hx     Liver cancer Neg Hx     Liver disease Neg Hx     Rectal cancer Neg Hx     Stomach cancer Neg Hx     Ulcerative colitis Neg Hx     Herminio's disease Neg Hx     Heart disease Brother 45        CAD        Social History:   Marital Status:   Alcohol History:  reports current alcohol use of about 1.0 standard drinks of alcohol per week.  Tobacco History:  reports that he has never smoked. He does not have any smokeless tobacco history on file.  Drug History:  has no history on file for drug.    Review of patient's allergies indicates:  No Known Allergies    Current Outpatient Medications   Medication Sig Dispense  Refill    naproxen (NAPROSYN) 500 MG tablet Take 1 tablet (500 mg total) by mouth 2 (two) times daily. 30 tablet 1    omeprazole (PRILOSEC) 40 MG capsule Take 1 capsule (40 mg total) by mouth every morning. 30 capsule 11    ibuprofen (ADVIL,MOTRIN) 800 MG tablet Take 800 mg by mouth every 8 (eight) hours as needed.      ibuprofen (ADVIL,MOTRIN) 800 MG tablet Take 1 tablet (800 mg total) by mouth 3 (three) times daily. (Patient not taking: Reported on 9/29/2020) 30 tablet 1     No current facility-administered medications for this visit.        Review of Systems      Objective:        Physical Exam:   Foot Exam    General  General Appearance: appears stated age and healthy   Orientation: alert and oriented to person, place, and time   Affect: appropriate   Gait: unimpaired       Left Foot/Ankle      Inspection and Palpation  Ecchymosis: none  Tenderness: (2nd intermetatarsal space )  Swelling: (Dorsal 2nd intermetatarsal space)  Arch: normal  Hammertoes: absent  Claw toes: absent  Hallux valgus: no  Hallux limitus: no  Skin Exam: skin intact;     Neurovascular  Dorsalis pedis: 2+  Posterior tibial: 2+  Saphenous nerve sensation: normal  Tibial nerve sensation: normal  Superficial peroneal nerve sensation: normal  Deep peroneal nerve sensation: normal  Sural nerve sensation: normal    Muscle Strength  Ankle dorsiflexion: 5  Ankle plantar flexion: 5  Ankle inversion: 5  Ankle eversion: 5  Great toe extension: 5  Great toe flexion: 5    Comments  Tenderness with forefoot compression at the 2nd intermetatarsal space    Physical Exam   Cardiovascular:   Pulses:       Dorsalis pedis pulses are 2+ on the left side.        Posterior tibial pulses are 2+ on the left side.   Musculoskeletal:      Left foot: No bunion.       Imaging: none            Assessment:       1. Neuroma    2. Left foot pain      Plan:   Neuroma    Left foot pain      Follow up Patient will be scheduled for outpatient surgery.    Procedures -  None    Patient was educated about the entire pre, catalina and post-operative time period.  They  were educated about the pro's and con's of surgery.  All conservative measures have been exhausted. Patient understands the particular risks involved which may occur in connection with the procedure proposed including pain, swelling, infection, stiffness, decreased ROM, recurrence, rejection, numbness, delayed healing, scar formation.    Patient was educated that their diagnosis may be surgically treated.  The patient's problem will probably advance and usually will not get better without surgery.  All of the pre-operative treatment plans have been exhausted or will no longer be successful at this point in time.  Patient was told of the possible outcomes and expectations of the surgical procedure.  They  will need to be followed-up post-operatively.  Today, pictures were drawn, questions answered.      We discussed the following surgical procedures:  Excision of neuroma 2nd intermetatarsal space left foot       Counseling:     I provided patient education verbally regarding:   Patient diagnosis, treatment options, as well as alternatives, risks, and benefits.     I counseled patient on causes and treatments for neuromas. Wearing tight or high-heeled shoes can cause a neuroma. Shoes that are too narrow or too pointed squeeze the bones in the ball of the foot. Shoes with high heels put extra pressure on the ends of the bones. When the bones are squeezed together, they pinch the nerve that runs between them. Taking off your shoes and rubbing the ball of your foot may decrease or relieve the pain. Recommend shoes with more room in the toe box. Sometimes steroid injection is necessary to alleviate symptoms. Discussed alcohol sclerosing injections as another option for conservative treatment.       This note was created using Dragon voice recognition software that occasionally misinterpreted phrases or words.

## 2020-09-29 NOTE — LETTER
September 29, 2020      [facility] - Podiatry  [address] [facility] [address] [facility] [address]  [place] [address]  Phone: [phone]  Fax: [phone]       Patient: Cory Musa   YOB: 1970  Date of Visit: 09/29/2020    To Whom It May Concern:    Luiz Musa  was at [employer] on 09/29/2020. He may not return to work at this time. Patient is having foot surgery on 10/6/2020 for Woodson's Neuroma of the left foot. Determination of when he is to return to work will depend on healing. If you have any questions or concerns, or if I can be of further assistance, please do not hesitate to contact me.    Sincerely,    Electronically Signed by: RAEGAN Servin RN

## 2020-10-02 ENCOUNTER — TELEPHONE (OUTPATIENT)
Dept: PODIATRY | Facility: CLINIC | Age: 50
End: 2020-10-02

## 2020-10-02 NOTE — TELEPHONE ENCOUNTER
Patient called regarding surgery for next Tuesday. He stated that he feels better, and wants to know if you are still willing to do the surgery. He knows the relief is only temporary and he still wants to continue. He just wanted to know how you feel about it.

## 2020-10-04 ENCOUNTER — LAB VISIT (OUTPATIENT)
Dept: PRIMARY CARE CLINIC | Facility: CLINIC | Age: 50
End: 2020-10-04
Payer: COMMERCIAL

## 2020-10-04 DIAGNOSIS — Z01.818 PRE-OP TESTING: ICD-10-CM

## 2020-10-04 PROCEDURE — U0003 INFECTIOUS AGENT DETECTION BY NUCLEIC ACID (DNA OR RNA); SEVERE ACUTE RESPIRATORY SYNDROME CORONAVIRUS 2 (SARS-COV-2) (CORONAVIRUS DISEASE [COVID-19]), AMPLIFIED PROBE TECHNIQUE, MAKING USE OF HIGH THROUGHPUT TECHNOLOGIES AS DESCRIBED BY CMS-2020-01-R: HCPCS

## 2020-10-05 ENCOUNTER — HOSPITAL ENCOUNTER (OUTPATIENT)
Dept: PREADMISSION TESTING | Facility: HOSPITAL | Age: 50
Discharge: HOME | End: 2020-10-05
Attending: PODIATRIST
Payer: COMMERCIAL

## 2020-10-05 ENCOUNTER — HOSPITAL ENCOUNTER (OUTPATIENT)
Dept: RADIOLOGY | Facility: HOSPITAL | Age: 50
Discharge: HOME | End: 2020-10-05
Attending: PODIATRIST
Payer: COMMERCIAL

## 2020-10-05 VITALS
DIASTOLIC BLOOD PRESSURE: 81 MMHG | OXYGEN SATURATION: 98 % | BODY MASS INDEX: 28 KG/M2 | SYSTOLIC BLOOD PRESSURE: 124 MMHG | HEIGHT: 67 IN | RESPIRATION RATE: 16 BRPM | HEART RATE: 77 BPM | TEMPERATURE: 98 F | WEIGHT: 178.38 LBS

## 2020-10-05 DIAGNOSIS — D36.10 NEUROMA: ICD-10-CM

## 2020-10-05 DIAGNOSIS — M79.672 LEFT FOOT PAIN: ICD-10-CM

## 2020-10-05 LAB — SARS-COV-2 RNA RESP QL NAA+PROBE: NOT DETECTED

## 2020-10-05 PROCEDURE — 93010 EKG 12-LEAD: ICD-10-PCS | Mod: ,,, | Performed by: INTERNAL MEDICINE

## 2020-10-05 PROCEDURE — 71046 X-RAY EXAM CHEST 2 VIEWS: CPT | Mod: TC

## 2020-10-05 PROCEDURE — 93010 ELECTROCARDIOGRAM REPORT: CPT | Mod: ,,, | Performed by: INTERNAL MEDICINE

## 2020-10-05 NOTE — DISCHARGE INSTRUCTIONS
INSTRUCTIONS  To confirm your doctor has scheduled your surgery for: Tuesday Oct 6th    COVID TESTING SCHEDULED: negative    Morning of surgery please check in with registration near Parking Garage Entrance then proceed to Outpatient Surgery Department.    Preop nurses will call the afternoon prior to surgery between 4:00 and 6:00 PM with your final arrival time.  PLEASE NOTE:  The surgery schedule has many variables which may affect the time of your surgery case. Family members should be available if your surgery time changes. Plan to be here the day of your procedure between 4-6 hours.    TAKE ONLY THESE MEDICATIONS WITH A SMALL SIP OF WATER THE MORNING OF SURGERY:     Omeprazole    DO NOT TAKE THESE MEDICATIONS 5-7 DAYS PRIOR to your procedure per your surgeon's request: ASPIRIN, ALEVE, BC powder, EZEQUIEL SELTZER, IBUPROFEN, FISH OIL, VITAMIN E, OR HERBALS   (May take Tylenol)       INSTRUCTIONS IMPORTANT!!  · Do not eat or drink anything between midnight and the time of your procedure- this includes gum, mints, and candy. You may brush your teeth but do not swallow.  · ONLY if you are diabetic, check your sugar in the morning before your procedure.  · Do not smoke, vape or drink alcoholic beverages 24 hours prior to your procedure.  · Shower the night before AND the morning of your procedure with a Chlorhexidine wash such as hibiclens or Dial antibacterial soap from neck down. Do not get it on your face or in your eyes. You may use your own shampoo and face wash. This helps your skin to be as bacteria free as possible.  · If you wear contact lenses, dentures, hearing aids or glasses, bring a container to put them in during surgery and give to a family member for safe keeping.    · Please leave all jewelry, piercing's and valuables at home.  · DO NOT remove hair from the surgery site. Do not shave the incision site unless you are given specific instructions to do so.   · Wear comfortable loose clothing and rubber  soled shoes.  · If your condition changes such as fever, cough, etc, please notify your surgeon.   · Make arrangements in advance for transportation home by a responsible adult.  · You must make arrangements for transportation, TAXI'S, UBER'S OR LYFTS ARE NOT ALLOWED.        If you have any questions about these instructions, call Pre-Op Admit  Nursing at [phone] or the Pre-Op Day Surgery Unit at [phone].

## 2020-10-06 ENCOUNTER — HOSPITAL ENCOUNTER (OUTPATIENT)
Facility: HOSPITAL | Age: 50
Discharge: HOME | End: 2020-10-06
Attending: PODIATRIST | Admitting: PODIATRIST
Payer: COMMERCIAL

## 2020-10-06 ENCOUNTER — ANESTHESIA EVENT (OUTPATIENT)
Dept: SURGERY | Facility: HOSPITAL | Age: 50
End: 2020-10-06
Payer: COMMERCIAL

## 2020-10-06 ENCOUNTER — ANESTHESIA (OUTPATIENT)
Dept: SURGERY | Facility: HOSPITAL | Age: 50
End: 2020-10-06
Payer: COMMERCIAL

## 2020-10-06 VITALS
HEART RATE: 74 BPM | OXYGEN SATURATION: 99 % | WEIGHT: 178.38 LBS | HEIGHT: 67 IN | RESPIRATION RATE: 18 BRPM | SYSTOLIC BLOOD PRESSURE: 141 MMHG | TEMPERATURE: 96 F | BODY MASS INDEX: 28 KG/M2 | DIASTOLIC BLOOD PRESSURE: 98 MMHG

## 2020-10-06 DIAGNOSIS — D36.10 NEUROMA: Primary | ICD-10-CM

## 2020-10-06 DIAGNOSIS — M79.672 LEFT FOOT PAIN: ICD-10-CM

## 2020-10-06 DIAGNOSIS — D36.10: ICD-10-CM

## 2020-10-06 PROCEDURE — 36000706: Performed by: PODIATRIST

## 2020-10-06 PROCEDURE — 37000008 HC ANESTHESIA 1ST 15 MINUTES: Performed by: PODIATRIST

## 2020-10-06 PROCEDURE — 63600175 PHARM REV CODE 636 W HCPCS: Performed by: NURSE ANESTHETIST, CERTIFIED REGISTERED

## 2020-10-06 PROCEDURE — 27000080 OPTIME MED/SURG SUP & DEVICES GENERAL CLASSIFICATION: Performed by: PODIATRIST

## 2020-10-06 PROCEDURE — 27202107 HC XP QUATRO SENSOR: Performed by: ANESTHESIOLOGY

## 2020-10-06 PROCEDURE — 27000673 HC TUBING BLOOD Y: Performed by: ANESTHESIOLOGY

## 2020-10-06 PROCEDURE — 25000003 PHARM REV CODE 250: Performed by: NURSE ANESTHETIST, CERTIFIED REGISTERED

## 2020-10-06 PROCEDURE — 27201423 OPTIME MED/SURG SUP & DEVICES STERILE SUPPLY: Performed by: PODIATRIST

## 2020-10-06 PROCEDURE — 27202103: Performed by: ANESTHESIOLOGY

## 2020-10-06 PROCEDURE — 27201107 HC STYLET, STANDARD: Performed by: ANESTHESIOLOGY

## 2020-10-06 PROCEDURE — S0028 INJECTION, FAMOTIDINE, 20 MG: HCPCS | Performed by: NURSE ANESTHETIST, CERTIFIED REGISTERED

## 2020-10-06 PROCEDURE — 71000033 HC RECOVERY, INTIAL HOUR: Performed by: PODIATRIST

## 2020-10-06 PROCEDURE — 27000284 HC CANNULA NASAL: Performed by: ANESTHESIOLOGY

## 2020-10-06 PROCEDURE — C9290 INJ, BUPIVACAINE LIPOSOME: HCPCS | Performed by: PODIATRIST

## 2020-10-06 PROCEDURE — 25000003 PHARM REV CODE 250: Performed by: ANESTHESIOLOGY

## 2020-10-06 PROCEDURE — 25000003 PHARM REV CODE 250: Performed by: PODIATRIST

## 2020-10-06 PROCEDURE — 37000009 HC ANESTHESIA EA ADD 15 MINS: Performed by: PODIATRIST

## 2020-10-06 PROCEDURE — 27000654 HC CATH IV JELCO: Performed by: ANESTHESIOLOGY

## 2020-10-06 PROCEDURE — 36000707: Performed by: PODIATRIST

## 2020-10-06 PROCEDURE — 63600175 PHARM REV CODE 636 W HCPCS: Performed by: PODIATRIST

## 2020-10-06 PROCEDURE — 27000671 HC TUBING MICROBORE EXT: Performed by: ANESTHESIOLOGY

## 2020-10-06 PROCEDURE — S0020 INJECTION, BUPIVICAINE HYDRO: HCPCS | Performed by: PODIATRIST

## 2020-10-06 PROCEDURE — 71000015 HC POSTOP RECOV 1ST HR: Performed by: PODIATRIST

## 2020-10-06 RX ORDER — CEFAZOLIN SODIUM 1 G/50ML
SOLUTION INTRAVENOUS
Status: DISCONTINUED | OUTPATIENT
Start: 2020-10-06 | End: 2020-10-06

## 2020-10-06 RX ORDER — HYDROCODONE BITARTRATE AND ACETAMINOPHEN 10; 325 MG/1; MG/1
1 TABLET ORAL EVERY 6 HOURS PRN
Qty: 30 TABLET | Refills: 0 | Status: SHIPPED | OUTPATIENT
Start: 2020-10-06 | End: 2020-10-14 | Stop reason: SDUPTHER

## 2020-10-06 RX ORDER — ONDANSETRON 2 MG/ML
INJECTION INTRAMUSCULAR; INTRAVENOUS
Status: DISCONTINUED | OUTPATIENT
Start: 2020-10-06 | End: 2020-10-06

## 2020-10-06 RX ORDER — BUPIVACAINE HYDROCHLORIDE 5 MG/ML
INJECTION, SOLUTION EPIDURAL; INTRACAUDAL
Status: DISCONTINUED | OUTPATIENT
Start: 2020-10-06 | End: 2020-10-06 | Stop reason: HOSPADM

## 2020-10-06 RX ORDER — HYDROCODONE BITARTRATE AND ACETAMINOPHEN 10; 325 MG/1; MG/1
1 TABLET ORAL EVERY 4 HOURS PRN
Status: CANCELLED | OUTPATIENT
Start: 2020-10-06

## 2020-10-06 RX ORDER — DEXAMETHASONE SODIUM PHOSPHATE 4 MG/ML
INJECTION, SOLUTION INTRA-ARTICULAR; INTRALESIONAL; INTRAMUSCULAR; INTRAVENOUS; SOFT TISSUE
Status: DISCONTINUED | OUTPATIENT
Start: 2020-10-06 | End: 2020-10-06

## 2020-10-06 RX ORDER — LIDOCAINE HYDROCHLORIDE 20 MG/ML
JELLY TOPICAL
Status: DISCONTINUED | OUTPATIENT
Start: 2020-10-06 | End: 2020-10-06

## 2020-10-06 RX ORDER — CELECOXIB 100 MG/1
200 CAPSULE ORAL ONCE
Status: COMPLETED | OUTPATIENT
Start: 2020-10-06 | End: 2020-10-06

## 2020-10-06 RX ORDER — SODIUM CHLORIDE 0.9 % (FLUSH) 0.9 %
10 SYRINGE (ML) INJECTION
Status: DISCONTINUED | OUTPATIENT
Start: 2020-10-06 | End: 2020-10-06 | Stop reason: HOSPADM

## 2020-10-06 RX ORDER — PROMETHAZINE HYDROCHLORIDE 25 MG/1
25 TABLET ORAL EVERY 6 HOURS PRN
Status: CANCELLED | OUTPATIENT
Start: 2020-10-06

## 2020-10-06 RX ORDER — ACETAMINOPHEN 500 MG
1000 TABLET ORAL ONCE
Status: COMPLETED | OUTPATIENT
Start: 2020-10-06 | End: 2020-10-06

## 2020-10-06 RX ORDER — OXYCODONE HYDROCHLORIDE 5 MG/1
5 TABLET ORAL
Status: DISCONTINUED | OUTPATIENT
Start: 2020-10-06 | End: 2020-10-06 | Stop reason: HOSPADM

## 2020-10-06 RX ORDER — SUCCINYLCHOLINE CHLORIDE 20 MG/ML
INJECTION INTRAMUSCULAR; INTRAVENOUS
Status: DISCONTINUED | OUTPATIENT
Start: 2020-10-06 | End: 2020-10-06

## 2020-10-06 RX ORDER — ONDANSETRON 2 MG/ML
4 INJECTION INTRAMUSCULAR; INTRAVENOUS DAILY PRN
Status: DISCONTINUED | OUTPATIENT
Start: 2020-10-06 | End: 2020-10-06 | Stop reason: HOSPADM

## 2020-10-06 RX ORDER — MIDAZOLAM HYDROCHLORIDE 1 MG/ML
INJECTION INTRAMUSCULAR; INTRAVENOUS
Status: DISCONTINUED | OUTPATIENT
Start: 2020-10-06 | End: 2020-10-06

## 2020-10-06 RX ORDER — ONDANSETRON 4 MG/1
8 TABLET, ORALLY DISINTEGRATING ORAL EVERY 8 HOURS PRN
Qty: 30 TABLET | Refills: 0 | Status: SHIPPED | OUTPATIENT
Start: 2020-10-06

## 2020-10-06 RX ORDER — ONDANSETRON 4 MG/1
8 TABLET, ORALLY DISINTEGRATING ORAL EVERY 8 HOURS PRN
Status: CANCELLED | OUTPATIENT
Start: 2020-10-06

## 2020-10-06 RX ORDER — HYDROCODONE BITARTRATE AND ACETAMINOPHEN 5; 325 MG/1; MG/1
1 TABLET ORAL EVERY 4 HOURS PRN
Status: CANCELLED | OUTPATIENT
Start: 2020-10-06

## 2020-10-06 RX ORDER — SODIUM CHLORIDE, SODIUM LACTATE, POTASSIUM CHLORIDE, CALCIUM CHLORIDE 600; 310; 30; 20 MG/100ML; MG/100ML; MG/100ML; MG/100ML
INJECTION, SOLUTION INTRAVENOUS CONTINUOUS PRN
Status: DISCONTINUED | OUTPATIENT
Start: 2020-10-06 | End: 2020-10-06

## 2020-10-06 RX ORDER — HYDROMORPHONE HYDROCHLORIDE 1 MG/ML
0.2 INJECTION, SOLUTION INTRAMUSCULAR; INTRAVENOUS; SUBCUTANEOUS
Status: DISCONTINUED | OUTPATIENT
Start: 2020-10-06 | End: 2020-10-06 | Stop reason: HOSPADM

## 2020-10-06 RX ORDER — FAMOTIDINE 10 MG/ML
INJECTION INTRAVENOUS
Status: DISCONTINUED | OUTPATIENT
Start: 2020-10-06 | End: 2020-10-06

## 2020-10-06 RX ORDER — PROPOFOL 10 MG/ML
VIAL (ML) INTRAVENOUS
Status: DISCONTINUED | OUTPATIENT
Start: 2020-10-06 | End: 2020-10-06

## 2020-10-06 RX ORDER — CEPHALEXIN 500 MG/1
500 CAPSULE ORAL EVERY 12 HOURS
Qty: 14 CAPSULE | Refills: 0 | Status: SHIPPED | OUTPATIENT
Start: 2020-10-06 | End: 2020-10-13

## 2020-10-06 RX ORDER — ROCURONIUM BROMIDE 10 MG/ML
INJECTION, SOLUTION INTRAVENOUS
Status: DISCONTINUED | OUTPATIENT
Start: 2020-10-06 | End: 2020-10-06

## 2020-10-06 RX ORDER — FENTANYL CITRATE 50 UG/ML
INJECTION, SOLUTION INTRAMUSCULAR; INTRAVENOUS
Status: DISCONTINUED | OUTPATIENT
Start: 2020-10-06 | End: 2020-10-06

## 2020-10-06 RX ORDER — MEPERIDINE HYDROCHLORIDE 50 MG/ML
12.5 INJECTION INTRAMUSCULAR; INTRAVENOUS; SUBCUTANEOUS EVERY 10 MIN PRN
Status: DISCONTINUED | OUTPATIENT
Start: 2020-10-06 | End: 2020-10-06 | Stop reason: HOSPADM

## 2020-10-06 RX ORDER — LIDOCAINE HYDROCHLORIDE 20 MG/ML
INJECTION, SOLUTION EPIDURAL; INFILTRATION; INTRACAUDAL; PERINEURAL
Status: DISCONTINUED | OUTPATIENT
Start: 2020-10-06 | End: 2020-10-06

## 2020-10-06 RX ORDER — DEXAMETHASONE SODIUM PHOSPHATE 4 MG/ML
INJECTION, SOLUTION INTRA-ARTICULAR; INTRALESIONAL; INTRAMUSCULAR; INTRAVENOUS; SOFT TISSUE
Status: DISCONTINUED | OUTPATIENT
Start: 2020-10-06 | End: 2020-10-06 | Stop reason: HOSPADM

## 2020-10-06 RX ORDER — DIPHENHYDRAMINE HYDROCHLORIDE 50 MG/ML
12.5 INJECTION INTRAMUSCULAR; INTRAVENOUS
Status: DISCONTINUED | OUTPATIENT
Start: 2020-10-06 | End: 2020-10-06 | Stop reason: HOSPADM

## 2020-10-06 RX ADMIN — LIDOCAINE HYDROCHLORIDE 70 MG: 20 INJECTION, SOLUTION INTRAVENOUS at 08:10

## 2020-10-06 RX ADMIN — PROPOFOL 150 MG: 10 INJECTION, EMULSION INTRAVENOUS at 09:10

## 2020-10-06 RX ADMIN — FENTANYL CITRATE 50 MCG: 50 INJECTION INTRAMUSCULAR; INTRAVENOUS at 08:10

## 2020-10-06 RX ADMIN — PROPOFOL 50 MG: 10 INJECTION, EMULSION INTRAVENOUS at 09:10

## 2020-10-06 RX ADMIN — DEXAMETHASONE SODIUM PHOSPHATE 8 MG: 4 INJECTION, SOLUTION INTRAMUSCULAR; INTRAVENOUS at 09:10

## 2020-10-06 RX ADMIN — ROCURONIUM BROMIDE 5 MG: 10 INJECTION, SOLUTION INTRAVENOUS at 09:10

## 2020-10-06 RX ADMIN — OXYCODONE HYDROCHLORIDE 5 MG: 5 TABLET ORAL at 10:10

## 2020-10-06 RX ADMIN — SODIUM CHLORIDE, SODIUM LACTATE, POTASSIUM CHLORIDE, AND CALCIUM CHLORIDE: .6; .31; .03; .02 INJECTION, SOLUTION INTRAVENOUS at 08:10

## 2020-10-06 RX ADMIN — LIDOCAINE HYDROCHLORIDE 3 ML: 20 JELLY TOPICAL at 09:10

## 2020-10-06 RX ADMIN — SODIUM CHLORIDE, SODIUM LACTATE, POTASSIUM CHLORIDE, AND CALCIUM CHLORIDE: .6; .31; .03; .02 INJECTION, SOLUTION INTRAVENOUS at 09:10

## 2020-10-06 RX ADMIN — MIDAZOLAM HYDROCHLORIDE 2 MG: 1 INJECTION, SOLUTION INTRAMUSCULAR; INTRAVENOUS at 08:10

## 2020-10-06 RX ADMIN — CEFAZOLIN SODIUM 2 G: 1 SOLUTION INTRAVENOUS at 08:10

## 2020-10-06 RX ADMIN — ONDANSETRON 4 MG: 2 INJECTION INTRAMUSCULAR; INTRAVENOUS at 08:10

## 2020-10-06 RX ADMIN — LIDOCAINE HYDROCHLORIDE 30 MG: 20 INJECTION, SOLUTION INTRAVENOUS at 10:10

## 2020-10-06 RX ADMIN — ACETAMINOPHEN 1000 MG: 500 TABLET, FILM COATED ORAL at 07:10

## 2020-10-06 RX ADMIN — SUCCINYLCHOLINE CHLORIDE 120 MG: 20 INJECTION, SOLUTION INTRAMUSCULAR; INTRAVENOUS at 09:10

## 2020-10-06 RX ADMIN — FAMOTIDINE 20 MG: 10 INJECTION, SOLUTION INTRAVENOUS at 09:10

## 2020-10-06 RX ADMIN — CELECOXIB 200 MG: 100 CAPSULE ORAL at 07:10

## 2020-10-06 NOTE — PLAN OF CARE
Transported to room 1554 via stretcher in stable condition.  Dressing D&I and toes with good capillary refill.  Pain decreased to 2/10.  Report given to TERESA Powell

## 2020-10-06 NOTE — H&P
10/06/2020  Cory Musa is a 50 y.o., male.         Patient Active Problem List   Diagnosis    GERD (gastroesophageal reflux disease)    Benign neuroma               Past Surgical History:   Procedure Laterality Date    FOOT SURGERY         left  excision mariscal's neuroma         Tobacco Use:  The patient  reports that he has never smoked. He does not have any smokeless tobacco history on file.          Results for orders placed or performed during the hospital encounter of 10/05/20   EKG 12-lead     Collection Time: 10/05/20  1:07 PM     Narrative     Test Reason : D36.10,M79.672,     Vent. Rate : 074 BPM     Atrial Rate : 074 BPM     P-R Int : 172 ms          QRS Dur : 090 ms      QT Int : 368 ms       P-R-T Axes : 036 032 041 degrees     QTc Int : 408 ms     Normal sinus rhythm  Normal ECG  When compared with ECG of 31-MAR-2012 07:15,  No significant change was found     Referred By:  MANCIL           Confirmed By:                Lab Results   Component Value Date     WBC 8.01 10/05/2020     HGB 15.8 10/05/2020     HCT 47.8 10/05/2020     MCV 92 10/05/2020      10/05/2020      BMP        Lab Results   Component Value Date      10/05/2020     K 4.5 10/05/2020      10/05/2020     CO2 27 10/05/2020     BUN 13 10/05/2020     CREATININE 1.0 10/05/2020     CALCIUM 9.6 10/05/2020     ANIONGAP 10 10/05/2020      (H) 10/05/2020      03/02/2011     GLU 95 03/06/2010         No results found for this or any previous visit.           Anesthesia Evaluation        Review of Systems  Hepatic/GI:   Hiatal Hernia, GERD (on omeprazole), well controlled    Musculoskeletal:  Musculoskeletal General/Symptoms: (back pain, none today)    Neurological:   Neuromuscular Disease, (Right sciatica pain, no pain today)          Physical Exam  General:  Well nourished    Airway/Jaw/Neck:  Airway Findings: Mouth Opening: Normal Tongue: Normal  General Airway Assessment: Adult  Mallampati: II  TM Distance:  Normal, at least 6 cm  Jaw/Neck Findings:  Neck ROM: Normal ROM     Eyes/Ears/Nose:  Eyes/Ears/Nose Findings:    Dental:  Dental Findings: In tact   Chest/Lungs:  Chest/Lungs Findings: Clear to auscultation, Normal Respiratory Rate     Heart/Vascular:  Heart Findings: Rate: Normal  Rhythm: Regular Rhythm  Sounds: Normal     Abdomen:  Abdomen Findings:     Musculoskeletal:  Musculoskeletal Findings:    Skin:  Skin Findings:     Mental Status:  Mental Status Findings:  Cooperative, Alert and Oriented     Left foot has good pulses, pink, normal capillary refill.     Ready for left foot surgery.

## 2020-10-06 NOTE — ANESTHESIA POSTPROCEDURE EVALUATION
Anesthesia Post Evaluation    Patient: Cory Musa    Procedure(s) Performed: Procedure(s) (LRB):  EXCISION, QUARLES'S NEUROMA (Left)    Final Anesthesia Type: general    Patient location during evaluation: PACU  Patient participation: Yes- Able to Participate  Level of consciousness: awake and alert  Post-procedure vital signs: reviewed and stable  Pain management: adequate  Airway patency: patent  SHERWIN mitigation strategies: Extubation and recovery carried out in lateral, semiupright, or other nonsupine position, Extubation while patient is awake and Multimodal analgesia  PONV status at discharge: No PONV  Anesthetic complications: no      Cardiovascular status: blood pressure returned to baseline  Respiratory status: unassisted  Hydration status: euvolemic  Follow-up not needed.          Vitals Value Taken Time   /88 10/06/20 1100   Temp 36 °C (96.8 °F) 10/06/20 1010   Pulse 69 10/06/20 1102   Resp 18 10/06/20 1102   SpO2 98 % 10/06/20 1102   Vitals shown include unvalidated device data.      No case tracking events are documented in the log.      Pain/Candi Score: Pain Rating Prior to Med Admin: 4 (10/6/2020 10:43 AM)  Candi Score: 9 (10/6/2020 10:10 AM)

## 2020-10-06 NOTE — PLAN OF CARE
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  Has dressing with steri strips, 4x4, Kerlix, Ace and post op shoe.  Toes warm with good capillary refill.  Able to wiggle toes.  Denies pain at this time

## 2020-10-06 NOTE — OP NOTE
Operative Report     Patient name: Cory Musa   MRN: [id number]  Date of surgery: 10/6/2020    Surgeon: Jagdeep Bustamante DPM   Assistant:  None    Preoperative diagnosis:  Neuroma 2nd intermetatarsal space left foot  Postoperative diagnosis:  The same as above  Procedure:  Excision of neuroma 2nd intermetatarsal space left foot  Anesthesia:  General  Hemostasis:  Pneumatic ankle tourniquet at 250 mmHg  Estimated blood loss:  5 mL   Specimen:  neuroma left foot  Complications: None  Condition upon discharge: Stable    Procedure in detail:  The patient was brought to the operating room placed the operating table in a supine position.  Following induction general anesthesia a well-padded pneumatic ankle tourniquet was placed around the patient's left ankle.  The left foot was then prepped scrubbed and draped in normal aseptic manner.  A time-out was then called.  An Esmarch bandage was then utilized to exsanguinate the left foot and pneumatic ankle tourniquet was inflated to 250 mmHg.  At this time utilizing 15.  Blade a linear longitudinal incision was made overlying the dorsal 2nd intermetatarsal space.  Patient had previous neuroma excision in this intermetatarsal space through a plantar incision.  Dissection was carried bluntly through subcutaneous tissues ensuring to retract all vital neurovascular structures.  Bleeders were cauterized as necessary.  A large amount of scar tissue was encountered and the metatarsal heads were noted to be very close together.  Large amount of scar tissue was freed.  It appeared that there has been partial excision of the previous neuroma with scar tissue and stump neuroma formation.  Some remaining scarred nerve tissue was identified and was excised.  Was passed from the operating field sent to pathology.  The area was inspected in no remaining nerve tissue was identified in the intermetatarsal space.  The wound was flushed with copious amounts sterile saline.  The pneumatic ankle  tourniquet was deflated and bleeders were cauterized as necessary.  Neurovascular status was noted to be intact to all digits of the left foot.  Subcutaneous tissues were closed utilizing 3-0 Vicryl.  Skin was closed utilizing 4-0 Vicryl in a running subcuticular fashion.  1 cc of Decadron was injected into the intermetatarsal space.  Local block consisting of 15 cc of a one-to-one mixture 0.5% Marcaine plain and Exparel was utilized in a block of the surgical site.  The patient's foot was dressed with Mastisol Steri-Strips 4x4s Kerlix and an Ace wrap.  Patient's left foot was placed in a postoperative shoe.  Patient tolerated the procedure well.  He had anesthesia reversed and left the operating Room stable vitals.    1. Keep dressings, clean, dry, and intact to surgical extremity.  2. Rest, ice, and elevate the surgical extremity.  3.  Limited weight-bearing as tolerated to surgical extremity in surgical shoe  4. Take all medication as discussed at discharge.  5. Contact the clinic with any postoperative concerns or complications.  6. Follow up in one week for 1st post op.

## 2020-10-06 NOTE — TRANSFER OF CARE
"Anesthesia Transfer of Care Note    Patient: Cory Musa    Procedure(s) Performed: Procedure(s) (LRB):  EXCISION, QUARLES'S NEUROMA (Left)    Patient location: PACU    Anesthesia Type: general    Transport from OR: Transported from OR on room air with adequate spontaneous ventilation    Post pain: adequate analgesia    Post assessment: no apparent anesthetic complications and tolerated procedure well    Post vital signs: stable    Level of consciousness: awake, alert and oriented    Nausea/Vomiting: no nausea/vomiting    Complications: none    Transfer of care protocol was followed      Last vitals:   Visit Vitals  /85 (BP Location: Right arm, Patient Position: Lying)   Pulse 78   Temp 36 °C (96.8 °F) (Temporal)   Resp 16   Ht 5' 7" (1.702 m)   Wt 80.9 kg (178 lb 5.6 oz)   SpO2 100%   BMI 27.93 kg/m²     "

## 2020-10-06 NOTE — ANESTHESIA PREPROCEDURE EVALUATION
10/06/2020  Cory Musa is a 50 y.o., male.    Patient Active Problem List   Diagnosis    GERD (gastroesophageal reflux disease)    Benign neuroma       Past Surgical History:   Procedure Laterality Date    FOOT SURGERY      left  excision mariscal's neuroma        Tobacco Use:  The patient  reports that he has never smoked. He does not have any smokeless tobacco history on file.     Results for orders placed or performed during the hospital encounter of 10/05/20   EKG 12-lead    Collection Time: 10/05/20  1:07 PM    Narrative    Test Reason : D36.10,M79.672,    Vent. Rate : 074 BPM     Atrial Rate : 074 BPM     P-R Int : 172 ms          QRS Dur : 090 ms      QT Int : 368 ms       P-R-T Axes : 036 032 041 degrees     QTc Int : 408 ms    Normal sinus rhythm  Normal ECG  When compared with ECG of 31-MAR-2012 07:15,  No significant change was found    Referred By:  MANCIL           Confirmed By:              Lab Results   Component Value Date    WBC 8.01 10/05/2020    HGB 15.8 10/05/2020    HCT 47.8 10/05/2020    MCV 92 10/05/2020     10/05/2020     BMP  Lab Results   Component Value Date     10/05/2020    K 4.5 10/05/2020     10/05/2020    CO2 27 10/05/2020    BUN 13 10/05/2020    CREATININE 1.0 10/05/2020    CALCIUM 9.6 10/05/2020    ANIONGAP 10 10/05/2020     (H) 10/05/2020     03/02/2011    GLU 95 03/06/2010       No results found for this or any previous visit.        Anesthesia Evaluation          Review of Systems  Hepatic/GI:   Hiatal Hernia, GERD (on omeprazole), well controlled    Musculoskeletal:  Musculoskeletal General/Symptoms: (back pain, none today)    Neurological:   Neuromuscular Disease, (Right sciatica pain, no pain today)        Physical Exam  General:  Well nourished    Airway/Jaw/Neck:  Airway Findings: Mouth Opening: Normal Tongue: Normal  General Airway  Assessment: Adult  Mallampati: II  TM Distance: Normal, at least 6 cm  Jaw/Neck Findings:  Neck ROM: Normal ROM     Eyes/Ears/Nose:  Eyes/Ears/Nose Findings:    Dental:  Dental Findings: In tact   Chest/Lungs:  Chest/Lungs Findings: Clear to auscultation, Normal Respiratory Rate     Heart/Vascular:  Heart Findings: Rate: Normal  Rhythm: Regular Rhythm  Sounds: Normal     Abdomen:  Abdomen Findings:     Musculoskeletal:  Musculoskeletal Findings:    Skin:  Skin Findings:     Mental Status:  Mental Status Findings:  Cooperative, Alert and Oriented     Left foot has good pulses, pink, normal capillary refill.    Anesthesia Plan  Type of Anesthesia, risks & benefits discussed:  Anesthesia Type:  general  Patient's Preference:   Intra-op Monitoring Plan: standard ASA monitors  Intra-op Monitoring Plan Comments:   Post Op Pain Control Plan: multimodal analgesia  Post Op Pain Control Plan Comments:   Induction:   IV  Beta Blocker:         Informed Consent: Patient understands risks and agrees with Anesthesia plan.  Questions answered. Anesthesia consent signed with patient.  ASA Score: 2     Day of Surgery Review of History & Physical:    H&P update referred to the surgeon.     Anesthesia Plan Notes: GETA.  Multimodal analgesia with Tylenol 1000 mg, Celebrex 200 mg (in holding), Decadron 8 mg IV.  PONV with zofran and pepcid (patient has hiatal hernia)          Ready For Surgery From Anesthesia Perspective.

## 2020-10-06 NOTE — ANESTHESIA PROCEDURE NOTES
Intubation  Performed by: Mayra Pruett CRNA  Authorized by: Rei Centeno Jr., MD     Intubation:     Induction:  Intravenous    Intubated:  Postinduction    Attempts:  1    Attempted By:  CRNA    Method of Intubation:  Direct    Blade:  Alexandre 2    Laryngeal View Grade: Grade I - full view of chords      Difficult Airway Encountered?: No      Complications:  None    Airway Device:  Oral endotracheal tube    Airway Device Size:  7.5    Style/Cuff Inflation:  Cuffed    Tube secured:  23    Secured at:  The lips    Placement Verified By:  Capnometry    Complicating Factors:  Large/floppy epiglottis    Findings Post-Intubation:  BS equal bilateral and atraumatic/condition of teeth unchanged

## 2020-10-07 DIAGNOSIS — M76.71 PERONEAL TENDINITIS OF RIGHT LOWER EXTREMITY: ICD-10-CM

## 2020-10-08 RX ORDER — IBUPROFEN 800 MG/1
TABLET ORAL
Qty: 30 TABLET | Refills: 1 | Status: SHIPPED | OUTPATIENT
Start: 2020-10-08 | End: 2020-10-28

## 2020-10-08 NOTE — TELEPHONE ENCOUNTER
Check with patient to see if they are requesting prescription versus automatic request from pharmacy.    This is a Dr. Bustamante patient

## 2020-10-09 ENCOUNTER — TELEPHONE (OUTPATIENT)
Dept: PODIATRY | Facility: CLINIC | Age: 50
End: 2020-10-09

## 2020-10-09 NOTE — TELEPHONE ENCOUNTER
Patient called and said he has been taking Keflex for after his surgery. He stated that he developed a rash from the antibiotics. I told him to stop the medication and Dr. Bustamante will re-evaluate at post-op appointment. Allergy is now noted in the chart. Patient states understanding

## 2020-10-12 ENCOUNTER — OFFICE VISIT (OUTPATIENT)
Dept: PODIATRY | Facility: CLINIC | Age: 50
End: 2020-10-12
Payer: COMMERCIAL

## 2020-10-12 VITALS
SYSTOLIC BLOOD PRESSURE: 122 MMHG | DIASTOLIC BLOOD PRESSURE: 88 MMHG | HEART RATE: 80 BPM | TEMPERATURE: 98 F | WEIGHT: 178 LBS | BODY MASS INDEX: 27.94 KG/M2 | HEIGHT: 67 IN

## 2020-10-12 DIAGNOSIS — M79.672 LEFT FOOT PAIN: ICD-10-CM

## 2020-10-12 DIAGNOSIS — D36.10 NEUROMA: Primary | ICD-10-CM

## 2020-10-12 PROCEDURE — 99024 POSTOP FOLLOW-UP VISIT: CPT | Mod: S$GLB,,, | Performed by: PODIATRIST

## 2020-10-12 PROCEDURE — 99024 PR POST-OP FOLLOW-UP VISIT: ICD-10-PCS | Mod: S$GLB,,, | Performed by: PODIATRIST

## 2020-10-12 NOTE — PROGRESS NOTES
[address] [facility]  [place] [address]  Phone: [phone]   [phone]    Patient's PCP:Jeyson Blount MD  Referring Provider:No ref. provider found    Subjective:      Chief Complaint: Post-Op      Date of Surgery: 10-6-20  Procedure: Excision of neuroma 2nd intermetatarsal space left foot    HPI:   Cory Musa is a 50 y.o. male who returns to the clinic today for his post-operative visit. Cory Musa rates pain a  0/10 on a pain scale. Compliance of Care: Dressing and ace intact, surgery shoe. Today doing good.     Vitals:    10/12/20 0947   BP: 122/88   Pulse: 80   Temp: 97.8 °F (36.6 °C)       Past Surgical History:   Procedure Laterality Date    FOOT SURGERY      left  excision woodson's neuroma    SURGICAL REMOVAL OF WOODSON'S NEUROMA Left 10/6/2020    Procedure: EXCISION, WOODSON'S NEUROMA;  Surgeon: Jagdeep Bustamante DPM;  Location: [facility];  Service: Podiatry;  Laterality: Left;     Past Medical History:   Diagnosis Date    GERD (gastroesophageal reflux disease)     Hernia of unspecified site of abdominal cavity without mention of obstruction or gangrene     hiatal    Hiatal hernia     Insomnia     Woodson's neuroma     Sciatica     l4-l5     Family History   Problem Relation Age of Onset    Heart disease Brother 45        CAD    Celiac disease Neg Hx     Cirrhosis Neg Hx     Colon cancer Neg Hx     Colon polyps Neg Hx     Crohn's disease Neg Hx     Cystic fibrosis Neg Hx     Esophageal cancer Neg Hx     Hemochromatosis Neg Hx     Inflammatory bowel disease Neg Hx     Irritable bowel syndrome Neg Hx     Liver cancer Neg Hx     Liver disease Neg Hx     Rectal cancer Neg Hx     Stomach cancer Neg Hx     Ulcerative colitis Neg Hx     Herminio's disease Neg Hx         Social History:   Marital Status:   Alcohol History:  reports current alcohol use of about 24.0 standard drinks of alcohol per week.  Tobacco History:  reports that he has never smoked. He does not  have any smokeless tobacco history on file.  Drug History:  has no history on file for drug.    Review of patient's allergies indicates:   Allergen Reactions    Keflex [cephalexin] Rash       Current Outpatient Medications   Medication Sig Dispense Refill    cephALEXin (KEFLEX) 500 MG capsule Take 1 capsule by mouth every 12 hours for 7 days 14 capsule 0    HYDROcodone-acetaminophen (NORCO)  mg per tablet Take 1 tablet by mouth every 6 (six) hours as needed for Pain. 30 tablet 0    ibuprofen (ADVIL,MOTRIN) 800 MG tablet TAKE 1 TABLET(800 MG) BY MOUTH THREE TIMES DAILY 30 tablet 1    naproxen (NAPROSYN) 500 MG tablet Take 1 tablet (500 mg total) by mouth 2 (two) times daily. 30 tablet 1    omeprazole (PRILOSEC) 40 MG capsule Take 1 capsule (40 mg total) by mouth every morning. 30 capsule 11    ondansetron (ZOFRAN-ODT) 4 MG TbDL Take 2 tablets (8 mg total) by mouth every 8 (eight) hours as needed. 30 tablet 0     No current facility-administered medications for this visit.        Review of Systems      Objective:        Post-op surgery of the left foot with normal healing, no signs of infection or dehiscence of wound.  Normal post op exam today. No redness, no drainage, no increase in local temperature, no significant swelling, sutures.steri-strips are intact.     Imaging: none     Physical Exam:   Foot Exam  Physical Exam       Assessment:       1. Neuroma    2. Left foot pain      Plan:   Neuroma    Left foot pain      Follow up in about 1 week (around 10/19/2020), or if symptoms worsen or fail to improve.    No notes on file - None    The surgical dressing was removed showing no signs of infection, excess edema or malalignment. A new dry dressing was applied and patient was instructed to leave dressing intact until next visit or until instructed to remove.     Continue weight-bearing in surgical shoe    This note was created using Dragon voice recognition software that occasionally misinterpreted  phrases or words.

## 2020-10-14 DIAGNOSIS — D36.10 NEUROMA: Primary | ICD-10-CM

## 2020-10-14 RX ORDER — HYDROCODONE BITARTRATE AND ACETAMINOPHEN 10; 325 MG/1; MG/1
1 TABLET ORAL EVERY 6 HOURS PRN
Qty: 28 TABLET | Refills: 0 | Status: SHIPPED | OUTPATIENT
Start: 2020-10-14

## 2020-10-14 NOTE — TELEPHONE ENCOUNTER
Patient called regarding refill on pain medication. His last prescription was filled 10/06/2020. I have pended the medication for your review.

## 2020-10-19 ENCOUNTER — OFFICE VISIT (OUTPATIENT)
Dept: PODIATRY | Facility: CLINIC | Age: 50
End: 2020-10-19
Payer: COMMERCIAL

## 2020-10-19 VITALS — BODY MASS INDEX: 27.88 KG/M2 | RESPIRATION RATE: 16 BRPM | HEIGHT: 67 IN | TEMPERATURE: 97 F

## 2020-10-19 DIAGNOSIS — M79.672 LEFT FOOT PAIN: ICD-10-CM

## 2020-10-19 DIAGNOSIS — D36.10 NEUROMA: Primary | ICD-10-CM

## 2020-10-19 PROCEDURE — 99024 POSTOP FOLLOW-UP VISIT: CPT | Mod: S$GLB,,, | Performed by: PODIATRIST

## 2020-10-19 PROCEDURE — 99024 PR POST-OP FOLLOW-UP VISIT: ICD-10-PCS | Mod: S$GLB,,, | Performed by: PODIATRIST

## 2020-10-19 NOTE — PROGRESS NOTES
"  [address] [facility] [address] [place] [address]  Phone: [phone]   [phone]    Patient's PCP:Jeyson Blount MD  Referring Provider:No ref. provider found    Subjective:      Chief Complaint: Post-Op    Date of Surgery: 10-6-20  Procedure: Excision of neuroma 2nd intermetatarsal space left foot    HPI:   Cory Musa is a 50 y.o. male who returns to the clinic today for his post-operative visit. Cory Musa rates pain a 3/10 on a pain scale. Still weight-bearing in surgical shoe. "Usually in the morning, hurts the worse."     Vitals:    10/19/20 0920   Resp: 16   Temp: 97.3 °F (36.3 °C)       Past Surgical History:   Procedure Laterality Date    FOOT SURGERY      left  excision woodson's neuroma    SURGICAL REMOVAL OF WOODSON'S NEUROMA Left 10/6/2020    Procedure: EXCISION, WOODSON'S NEUROMA;  Surgeon: Jagdeep Bustamante DPM;  Location: [facility];  Service: Podiatry;  Laterality: Left;     Past Medical History:   Diagnosis Date    GERD (gastroesophageal reflux disease)     Hernia of unspecified site of abdominal cavity without mention of obstruction or gangrene     hiatal    Hiatal hernia     Insomnia     Woodson's neuroma     Sciatica     l4-l5     Family History   Problem Relation Age of Onset    Heart disease Brother 45        CAD    Celiac disease Neg Hx     Cirrhosis Neg Hx     Colon cancer Neg Hx     Colon polyps Neg Hx     Crohn's disease Neg Hx     Cystic fibrosis Neg Hx     Esophageal cancer Neg Hx     Hemochromatosis Neg Hx     Inflammatory bowel disease Neg Hx     Irritable bowel syndrome Neg Hx     Liver cancer Neg Hx     Liver disease Neg Hx     Rectal cancer Neg Hx     Stomach cancer Neg Hx     Ulcerative colitis Neg Hx     Herminio's disease Neg Hx         Social History:   Marital Status:   Alcohol History:  reports current alcohol use of about 24.0 standard drinks of alcohol per week.  Tobacco History:  reports that he has never smoked. He does not have any " smokeless tobacco history on file.  Drug History:  has no history on file for drug.    Review of patient's allergies indicates:   Allergen Reactions    Keflex [cephalexin] Rash       Current Outpatient Medications   Medication Sig Dispense Refill    HYDROcodone-acetaminophen (NORCO)  mg per tablet Take 1 tablet by mouth every 6 (six) hours as needed for Pain. 28 tablet 0    omeprazole (PRILOSEC) 40 MG capsule Take 1 capsule (40 mg total) by mouth every morning. 30 capsule 11    ibuprofen (ADVIL,MOTRIN) 800 MG tablet TAKE 1 TABLET(800 MG) BY MOUTH THREE TIMES DAILY (Patient not taking: Reported on 10/19/2020) 30 tablet 1    naproxen (NAPROSYN) 500 MG tablet Take 1 tablet (500 mg total) by mouth 2 (two) times daily. (Patient not taking: Reported on 10/19/2020) 30 tablet 1    ondansetron (ZOFRAN-ODT) 4 MG TbDL Take 2 tablets (8 mg total) by mouth every 8 (eight) hours as needed. (Patient not taking: Reported on 10/19/2020) 30 tablet 0     No current facility-administered medications for this visit.        Review of Systems      Objective:        Post-op surgery of the left foot with normal healing, no signs of infection or dehiscence of wound. Normal post op exam today. No redness, no drainage, no increase in local temperature, no significant swelling, sutures.steri-strips are intact.     Imaging: none     Physical Exam:   Foot Exam  Physical Exam       Assessment:       1. Neuroma    2. Left foot pain      Plan:   Neuroma    Left foot pain      Follow up in about 4 weeks (around 11/16/2020), or if symptoms worsen or fail to improve.    Procedures - none     The surgical dressing was removed showing no signs of infection, excess edema or malalignment. A new dry dressing was applied and patient was instructed to leave dressing intact until next visit or until instructed to remove.     The patient can begin showering the foot.  Okay to transition to normal shoe gear as tolerated.  Limit heavy impact  activities.  Ice as needed for pain and swelling.    This note was created using Dragon voice recognition software that occasionally misinterpreted phrases or words.

## 2020-10-23 RX ORDER — NAPROXEN 500 MG/1
500 TABLET ORAL 2 TIMES DAILY
Qty: 60 TABLET | Refills: 1 | Status: SHIPPED | OUTPATIENT
Start: 2020-10-23

## 2020-11-16 ENCOUNTER — OFFICE VISIT (OUTPATIENT)
Dept: PODIATRY | Facility: CLINIC | Age: 50
End: 2020-11-16
Payer: COMMERCIAL

## 2020-11-16 VITALS
TEMPERATURE: 98 F | WEIGHT: 178.5 LBS | DIASTOLIC BLOOD PRESSURE: 88 MMHG | SYSTOLIC BLOOD PRESSURE: 137 MMHG | RESPIRATION RATE: 20 BRPM | HEART RATE: 65 BPM | BODY MASS INDEX: 28.02 KG/M2 | HEIGHT: 67 IN

## 2020-11-16 DIAGNOSIS — D36.10 NEUROMA: Primary | ICD-10-CM

## 2020-11-16 DIAGNOSIS — M79.672 LEFT FOOT PAIN: ICD-10-CM

## 2020-11-16 PROCEDURE — 99024 POSTOP FOLLOW-UP VISIT: CPT | Mod: S$GLB,,, | Performed by: PODIATRIST

## 2020-11-16 PROCEDURE — 99024 PR POST-OP FOLLOW-UP VISIT: ICD-10-PCS | Mod: S$GLB,,, | Performed by: PODIATRIST

## 2020-11-16 NOTE — PROGRESS NOTES
[address] [facility] [address]  [place] [address]  Phone: [phone]   [phone]    Patient's PCP:Jeyson Blount MD  Referring Provider:No ref. provider found    Subjective:      Chief Complaint: Post-Op    Date of Surgery: 10/06/2020  Procedure:  Excision of Neuroma Left Foot    HPI:   Cory Musa is a 50 y.o. male who returns to the clinic today for his post-operative visit. Cory Musa rates pain a 0/10 on a pain scale.  States he has been steadily increasing his activities.    Vitals:    11/16/20 0952   BP: 137/88   Pulse: 65   Resp: 20   Temp: 98 °F (36.7 °C)       Past Surgical History:   Procedure Laterality Date    FOOT SURGERY      left  excision woodson's neuroma    SURGICAL REMOVAL OF WOODSON'S NEUROMA Left 10/6/2020    Procedure: EXCISION, WOODSON'S NEUROMA;  Surgeon: Jagdeep Bustamante DPM;  Location: [facility];  Service: Podiatry;  Laterality: Left;     Past Medical History:   Diagnosis Date    GERD (gastroesophageal reflux disease)     Hernia of unspecified site of abdominal cavity without mention of obstruction or gangrene     hiatal    Hiatal hernia     Insomnia     Woodson's neuroma     Sciatica     l4-l5     Family History   Problem Relation Age of Onset    Heart disease Brother 45        CAD    Celiac disease Neg Hx     Cirrhosis Neg Hx     Colon cancer Neg Hx     Colon polyps Neg Hx     Crohn's disease Neg Hx     Cystic fibrosis Neg Hx     Esophageal cancer Neg Hx     Hemochromatosis Neg Hx     Inflammatory bowel disease Neg Hx     Irritable bowel syndrome Neg Hx     Liver cancer Neg Hx     Liver disease Neg Hx     Rectal cancer Neg Hx     Stomach cancer Neg Hx     Ulcerative colitis Neg Hx     Herminio's disease Neg Hx         Social History:   Marital Status:   Alcohol History:  reports current alcohol use of about 24.0 standard drinks of alcohol per week.  Tobacco History:  reports that he has never smoked. He does not have any smokeless tobacco history on  file.  Drug History:  has no history on file for drug.    Review of patient's allergies indicates:   Allergen Reactions    Mobic [meloxicam]      Rash and itching    Keflex [cephalexin] Rash       Current Outpatient Medications   Medication Sig Dispense Refill    ibuprofen (ADVIL,MOTRIN) 800 MG tablet TAKE 1 TABLET(800 MG) BY MOUTH THREE TIMES DAILY 30 tablet 1    omeprazole (PRILOSEC) 40 MG capsule Take 1 capsule (40 mg total) by mouth every morning. 30 capsule 11    HYDROcodone-acetaminophen (NORCO)  mg per tablet Take 1 tablet by mouth every 6 (six) hours as needed for Pain. (Patient not taking: Reported on 11/16/2020) 28 tablet 0    naproxen (NAPROSYN) 500 MG tablet Take 1 tablet (500 mg total) by mouth 2 (two) times daily. (Patient not taking: Reported on 10/19/2020) 30 tablet 1    naproxen (NAPROSYN) 500 MG tablet Take 1 tablet (500 mg total) by mouth 2 (two) times daily. (Patient not taking: Reported on 11/16/2020) 60 tablet 1    ondansetron (ZOFRAN-ODT) 4 MG TbDL Take 2 tablets (8 mg total) by mouth every 8 (eight) hours as needed. (Patient not taking: Reported on 11/16/2020) 30 tablet 0     No current facility-administered medications for this visit.        Review of Systems      Objective:        Post-op surgery of the left foot with normal healing, no signs of infection or dehiscence of wound. Normal post op exam today. No redness, no drainage, no increase in local temperature, no significant swelling, surgical incision well healed.    Imaging: none     Physical Exam:   Foot Exam  Physical Exam       Assessment:       1. Neuroma    2. Left foot pain      Plan:   Neuroma    Left foot pain      Follow up if symptoms worsen or fail to improve.    Procedures - None    Patient to continue to increase activities as tolerated.  Ice as needed for pain and swelling.  Patient states she still having some pain with going up and down stairs.  Recommend waiting several weeks before patient can return to  work without restrictions.    This note was created using Dragon voice recognition software that occasionally misinterpreted phrases or words.

## 2021-04-28 DIAGNOSIS — K21.9 GASTROESOPHAGEAL REFLUX DISEASE WITHOUT ESOPHAGITIS: Primary | ICD-10-CM

## 2023-03-16 ENCOUNTER — OFFICE VISIT (OUTPATIENT)
Dept: URGENT CARE | Facility: CLINIC | Age: 53
End: 2023-03-16
Payer: COMMERCIAL

## 2023-03-16 VITALS
SYSTOLIC BLOOD PRESSURE: 165 MMHG | TEMPERATURE: 98 F | DIASTOLIC BLOOD PRESSURE: 108 MMHG | OXYGEN SATURATION: 100 % | HEART RATE: 68 BPM | WEIGHT: 165 LBS | HEIGHT: 67 IN | RESPIRATION RATE: 16 BRPM | BODY MASS INDEX: 25.9 KG/M2

## 2023-03-16 DIAGNOSIS — S29.011A MUSCLE STRAIN OF ANTERIOR CHEST WALL: ICD-10-CM

## 2023-03-16 DIAGNOSIS — J40 BRONCHITIS: Primary | ICD-10-CM

## 2023-03-16 DIAGNOSIS — R05.2 SUBACUTE COUGH: ICD-10-CM

## 2023-03-16 DIAGNOSIS — M89.8X1 PAIN OF LEFT CLAVICLE: ICD-10-CM

## 2023-03-16 PROCEDURE — 99204 PR OFFICE/OUTPT VISIT, NEW, LEVL IV, 45-59 MIN: ICD-10-PCS | Mod: S$GLB,,, | Performed by: NURSE PRACTITIONER

## 2023-03-16 PROCEDURE — 99204 OFFICE O/P NEW MOD 45 MIN: CPT | Mod: S$GLB,,, | Performed by: NURSE PRACTITIONER

## 2023-03-16 RX ORDER — DOXYCYCLINE 100 MG/1
100 CAPSULE ORAL EVERY 12 HOURS
Qty: 20 CAPSULE | Refills: 0 | Status: SHIPPED | OUTPATIENT
Start: 2023-03-16 | End: 2023-03-26

## 2023-03-16 RX ORDER — IBUPROFEN 800 MG/1
800 TABLET ORAL 3 TIMES DAILY
Qty: 30 TABLET | Refills: 0 | Status: SHIPPED | OUTPATIENT
Start: 2023-03-16 | End: 2023-03-26

## 2023-03-16 RX ORDER — BENZONATATE 200 MG/1
200 CAPSULE ORAL 3 TIMES DAILY PRN
Qty: 30 CAPSULE | Refills: 0 | Status: SHIPPED | OUTPATIENT
Start: 2023-03-16 | End: 2023-03-26

## 2023-03-16 RX ORDER — BACLOFEN 10 MG/1
10 TABLET ORAL 3 TIMES DAILY
Qty: 30 TABLET | Refills: 0 | Status: SHIPPED | OUTPATIENT
Start: 2023-03-16 | End: 2023-03-26

## 2023-03-16 NOTE — PROGRESS NOTES
"Subjective:       Patient ID: Cory Musa is a 53 y.o. male.    Vitals:  height is 5' 7" (1.702 m) and weight is 74.8 kg (165 lb). His oral temperature is 98 °F (36.7 °C). His blood pressure is 165/108 (abnormal) and his pulse is 68. His respiration is 16 and oxygen saturation is 100%.     Chief Complaint: Cough    Cory Musa presents to clinic with cough that has been present for the last month.  Patient reports that the cough is dry in nature and denies any fever chills or other symptoms.  Patient does report that approximately 1 month ago he had to complete a job that required him to be exposed to river water, sand and  fire [place].  Patient does feel that during this same time COVID was going around his office.  Patient does report that he never tested for COVID but did lose his sense of smell for approximately 24 hours so he is pretty sure that he had it.  In addition to this patient states that he has left clavicle pain that has been present for slightly greater than 4 weeks.  Patient does report that when the pain began he had to complete very heavy lifting for his job and the pain started within 2-3 days afterwards.  Patient has been taking over-the-counter Tylenol as well as Motrin on an inconsistent basis and states that the pain does wax and wane.  Patient reports that since he has to come in for his cough today he would like to make sure that he does not have a fracture of the left clavicle.  Patient denies any other complaints at this time.    Cough  This is a new problem. The current episode started more than 1 month ago. The problem has been unchanged. Associated symptoms comments: Pt has collar bone pain. The symptoms are aggravated by fumes.     Constitution: Negative.   HENT: Negative.     Neck: neck negative.   Cardiovascular: Negative.    Eyes: Negative.    Respiratory:  Positive for cough.    Gastrointestinal: Negative.    Endocrine: negative.   Genitourinary: Negative.    Musculoskeletal: " Negative.  Positive for pain.   Skin: Negative.      Objective:      Physical Exam   Constitutional: He is oriented to person, place, and time. He appears well-developed. He is cooperative.  Non-toxic appearance. He does not appear ill. No distress.   HENT:   Head: Normocephalic and atraumatic.   Ears:   Right Ear: Hearing, tympanic membrane, external ear and ear canal normal.   Left Ear: Hearing, tympanic membrane, external ear and ear canal normal.   Nose: Nose normal. No mucosal edema, rhinorrhea or nasal deformity. No epistaxis. Right sinus exhibits no maxillary sinus tenderness and no frontal sinus tenderness. Left sinus exhibits no maxillary sinus tenderness and no frontal sinus tenderness.   Mouth/Throat: Uvula is midline, oropharynx is clear and moist and mucous membranes are normal. No trismus in the jaw. Normal dentition. No uvula swelling. No oropharyngeal exudate, posterior oropharyngeal edema or posterior oropharyngeal erythema.   Eyes: Conjunctivae and lids are normal. No scleral icterus.   Neck: Trachea normal and phonation normal. Neck supple. No edema present. No erythema present. No neck rigidity present.   Cardiovascular: Normal rate, regular rhythm, normal heart sounds and normal pulses.   Pulmonary/Chest: Effort normal and breath sounds normal. No respiratory distress. He has no decreased breath sounds. He has no rhonchi.   Abdominal: Normal appearance.   Musculoskeletal: Normal range of motion.         General: No deformity. Normal range of motion.   Neurological: He is alert and oriented to person, place, and time. He exhibits normal muscle tone. Coordination normal.   Skin: Skin is warm, dry, intact, not diaphoretic and not pale.   Psychiatric: His speech is normal and behavior is normal. Judgment and thought content normal.   Nursing note and vitals reviewed.      Assessment:       1. Bronchitis    2. Subacute cough    3. Pain of left clavicle    4. Muscle strain of anterior chest wall             Plan:         Bronchitis  -     doxycycline (VIBRAMYCIN) 100 MG Cap; Take 1 capsule (100 mg total) by mouth every 12 (twelve) hours. for 10 days  Dispense: 20 capsule; Refill: 0  -     benzonatate (TESSALON) 200 MG capsule; Take 1 capsule (200 mg total) by mouth 3 (three) times daily as needed for Cough.  Dispense: 30 capsule; Refill: 0    Subacute cough  -     XR CHEST PA AND LATERAL; Future; Expected date: 03/16/2023  -     doxycycline (VIBRAMYCIN) 100 MG Cap; Take 1 capsule (100 mg total) by mouth every 12 (twelve) hours. for 10 days  Dispense: 20 capsule; Refill: 0  -     benzonatate (TESSALON) 200 MG capsule; Take 1 capsule (200 mg total) by mouth 3 (three) times daily as needed for Cough.  Dispense: 30 capsule; Refill: 0    Pain of left clavicle  -     XR CHEST PA AND LATERAL; Future; Expected date: 03/16/2023  -     baclofen (LIORESAL) 10 MG tablet; Take 1 tablet (10 mg total) by mouth 3 (three) times daily. for 10 days  Dispense: 30 tablet; Refill: 0  -     ibuprofen (ADVIL,MOTRIN) 800 MG tablet; Take 1 tablet (800 mg total) by mouth 3 (three) times daily. for 10 days  Dispense: 30 tablet; Refill: 0    Muscle strain of anterior chest wall  -     baclofen (LIORESAL) 10 MG tablet; Take 1 tablet (10 mg total) by mouth 3 (three) times daily. for 10 days  Dispense: 30 tablet; Refill: 0  -     ibuprofen (ADVIL,MOTRIN) 800 MG tablet; Take 1 tablet (800 mg total) by mouth 3 (three) times daily. for 10 days  Dispense: 30 tablet; Refill: 0         Patient advised that chest x-ray is clear aside from a 6 mm nodule that is located in the right mid lung as per Radiology interpretation.  Patient has been advised to follow closely with primary care to monitor this.  Patient verbalizes understanding.

## 2023-07-19 ENCOUNTER — OFFICE VISIT (OUTPATIENT)
Dept: DERMATOLOGY | Facility: CLINIC | Age: 53
End: 2023-07-19
Payer: COMMERCIAL

## 2023-07-19 VITALS — BODY MASS INDEX: 25.9 KG/M2 | WEIGHT: 165 LBS | HEIGHT: 67 IN

## 2023-07-19 DIAGNOSIS — Z12.83 SKIN CANCER SCREENING: ICD-10-CM

## 2023-07-19 DIAGNOSIS — L57.0 ACTINIC KERATOSES: Primary | ICD-10-CM

## 2023-07-19 DIAGNOSIS — L81.4 SOLAR LENTIGO: ICD-10-CM

## 2023-07-19 DIAGNOSIS — D22.9 MULTIPLE BENIGN NEVI: ICD-10-CM

## 2023-07-19 DIAGNOSIS — L57.8 OTHER SKIN CHANGES DUE TO CHRONIC EXPOSURE TO NONIONIZING RADIATION: ICD-10-CM

## 2023-07-19 PROCEDURE — 17000 DESTRUCT PREMALG LESION: CPT | Mod: S$GLB,,, | Performed by: DERMATOLOGY

## 2023-07-19 PROCEDURE — 17003 DESTRUCTION, PREMALIGNANT LESIONS; SECOND THROUGH 14 LESIONS: ICD-10-PCS | Mod: S$GLB,,, | Performed by: DERMATOLOGY

## 2023-07-19 PROCEDURE — 17000 PR DESTRUCTION(LASER SURGERY,CRYOSURGERY,CHEMOSURGERY),PREMALIGNANT LESIONS,FIRST LESION: ICD-10-PCS | Mod: S$GLB,,, | Performed by: DERMATOLOGY

## 2023-07-19 PROCEDURE — 17003 DESTRUCT PREMALG LES 2-14: CPT | Mod: S$GLB,,, | Performed by: DERMATOLOGY

## 2023-07-19 PROCEDURE — 99203 OFFICE O/P NEW LOW 30 MIN: CPT | Mod: 25,S$GLB,, | Performed by: DERMATOLOGY

## 2023-07-19 PROCEDURE — 99203 PR OFFICE/OUTPT VISIT, NEW, LEVL III, 30-44 MIN: ICD-10-PCS | Mod: 25,S$GLB,, | Performed by: DERMATOLOGY

## 2023-07-19 NOTE — PATIENT INSTRUCTIONS

## 2023-07-19 NOTE — PROGRESS NOTES
Subjective:      Patient ID:  Cory Musa is a 53 y.o. male who presents for   Chief Complaint   Patient presents with    Skin Check     UBSC     New Patient    Patient here today for UBSC.  H/o lesions biopsied from scalp, benign per patient  Moderate sun exposure, recreational  Oil refinery worker  Rides motorcycle on week neds    Derm Hx:  Denies phx of NMSC  Hx of MM- father    Current Outpatient Medications:   ·  ibuprofen (ADVIL,MOTRIN) 800 MG tablet, TAKE 1 TABLET(800 MG) BY MOUTH THREE TIMES DAILY, Disp: 30 tablet, Rfl: 1  ·  omeprazole (PRILOSEC) 40 MG capsule, Take 1 capsule (40 mg total) by mouth every morning., Disp: 30 capsule, Rfl: 11  ·  baclofen (LIORESAL) 10 MG tablet, Take 1 tablet (10 mg total) by mouth 3 (three) times daily. for 10 days, Disp: 30 tablet, Rfl: 0  ·  HYDROcodone-acetaminophen (NORCO)  mg per tablet, Take 1 tablet by mouth every 6 (six) hours as needed for Pain. (Patient not taking: Reported on 11/16/2020), Disp: 28 tablet, Rfl: 0  ·  naproxen (NAPROSYN) 500 MG tablet, Take 1 tablet (500 mg total) by mouth 2 (two) times daily. (Patient not taking: Reported on 10/19/2020), Disp: 30 tablet, Rfl:   ·  ondansetron (ZOFRAN-ODT) 4 MG TbDL, Take 2 tablets (8 mg total) by mouth every 8 (eight) hours as needed. (Patient not taking: Reported on 11/16/2020), Disp: 30 tablet, Rfl: 0      Review of Systems   Constitutional:  Negative for fever, chills and fatigue.   Skin:  Positive for activity-related sunscreen use and wears hat.     Objective:   Physical Exam   Constitutional: He appears well-developed and well-nourished. No distress.   Neurological: He is alert and oriented to person, place, and time. He is not disoriented.   Psychiatric: He has a normal mood and affect.   Skin:   Areas Examined (abnormalities noted in diagram):   Scalp / Hair Palpated and Inspected  Head / Face Inspection Performed  Neck Inspection Performed  Chest / Axilla Inspection Performed  Abdomen Inspection  Performed  Back Inspection Performed  RUE Inspected  LUE Inspection Performed  Nails and Digits Inspection Performed               Diagram Legend     Erythematous scaling macule/papule c/w actinic keratosis       Vascular papule c/w angioma      Pigmented verrucoid papule/plaque c/w seborrheic keratosis      Yellow umbilicated papule c/w sebaceous hyperplasia      Irregularly shaped tan macule c/w lentigo     1-2 mm smooth white papules consistent with Milia      Movable subcutaneous cyst with punctum c/w epidermal inclusion cyst      Subcutaneous movable cyst c/w pilar cyst      Firm pink to brown papule c/w dermatofibroma      Pedunculated fleshy papule(s) c/w skin tag(s)      Evenly pigmented macule c/w junctional nevus     Mildly variegated pigmented, slightly irregular-bordered macule c/w mildly atypical nevus      Flesh colored to evenly pigmented papule c/w intradermal nevus       Pink pearly papule/plaque c/w basal cell carcinoma      Erythematous hyperkeratotic cursted plaque c/w SCC      Surgical scar with no sign of skin cancer recurrence      Open and closed comedones      Inflammatory papules and pustules      Verrucoid papule consistent consistent with wart     Erythematous eczematous patches and plaques     Dystrophic onycholytic nail with subungual debris c/w onychomycosis     Umbilicated papule    Erythematous-base heme-crusted tan verrucoid plaque consistent with inflamed seborrheic keratosis     Erythematous Silvery Scaling Plaque c/w Psoriasis     See annotation      Assessment / Plan:        Actinic keratoses  Cryosurgery Procedure Note    Verbal consent from the patient is obtained and the patient is aware of the precancerous quality and need for treatment of these lesions. Liquid nitrogen cryosurgery is applied to the 2 actinic keratoses, as detailed in the physical exam, to produce a freeze injury. The patient is aware that blisters may form and is instructed on wound care with gentle  cleansing and use of vaseline ointment to keep moist until healed. The patient is supplied a handout on cryosurgery and is instructed to call if lesions do not completely resolve.    Solar lentigo  This is a benign hyperpigmented sun induced lesion. Daily sun protection will reduce the number of new lesions. Treatment of these benign lesions are considered cosmetic.    Multiple benign nevi  Careful dermoscopy evaluation of nevi performed with none identified as needing biopsy today  Monitor for new mole or moles that are becoming bigger, darker, irritated, or developing irregular borders.     Skin cancer screening  Upper body skin examination performed today including at least 9 points as noted in physical examination. No lesions suspicious for malignancy noted.    Other skin changes due to chronic exposure to nonionizing radiation  Patient instructed in importance in daily broad spectrum sun protection of at least spf 30. Mineral sunscreen ingredients preferred (Zinc +/- Titanium) and can be found OTC.   Recommend Elta MD for daily use on face and neck.  Patient encouraged to wear hat for all outdoor exposure.   Also discussed sun avoidance and use of protective clothing.             Follow up in about 1 year (around 7/19/2024), or if symptoms worsen or fail to improve.

## 2023-08-16 DIAGNOSIS — M24.9 DERANGEMENT, JOINT: Primary | ICD-10-CM

## 2023-08-16 DIAGNOSIS — M24.9 JOINT DERANGEMENT: Primary | ICD-10-CM

## 2023-08-17 ENCOUNTER — HOSPITAL ENCOUNTER (OUTPATIENT)
Dept: RADIOLOGY | Facility: HOSPITAL | Age: 53
Discharge: HOME | End: 2023-08-17
Attending: ORTHOPAEDIC SURGERY
Payer: COMMERCIAL

## 2023-08-17 DIAGNOSIS — M24.9 JOINT DERANGEMENT: ICD-10-CM

## 2023-08-17 PROCEDURE — 72141 MRI NECK SPINE W/O DYE: CPT | Mod: 26,,, | Performed by: RADIOLOGY

## 2023-08-17 PROCEDURE — 72141 MRI CERVICAL SPINE WITHOUT CONTRAST: ICD-10-PCS | Mod: 26,,, | Performed by: RADIOLOGY

## 2023-08-17 PROCEDURE — 72141 MRI NECK SPINE W/O DYE: CPT | Mod: TC

## 2024-03-05 ENCOUNTER — HOSPITAL ENCOUNTER (OUTPATIENT)
Facility: HOSPITAL | Age: 54
Discharge: HOME | End: 2024-03-05
Attending: ANESTHESIOLOGY | Admitting: ANESTHESIOLOGY
Payer: COMMERCIAL

## 2024-03-05 VITALS
WEIGHT: 160 LBS | RESPIRATION RATE: 20 BRPM | HEIGHT: 67 IN | SYSTOLIC BLOOD PRESSURE: 113 MMHG | DIASTOLIC BLOOD PRESSURE: 87 MMHG | HEART RATE: 73 BPM | TEMPERATURE: 98 F | OXYGEN SATURATION: 97 % | BODY MASS INDEX: 25.11 KG/M2

## 2024-03-05 DIAGNOSIS — M54.12 CERVICAL RADICULOPATHY: Primary | ICD-10-CM

## 2024-03-05 PROCEDURE — 25000003 PHARM REV CODE 250: Performed by: ANESTHESIOLOGY

## 2024-03-05 PROCEDURE — 63600175 PHARM REV CODE 636 W HCPCS: Performed by: ANESTHESIOLOGY

## 2024-03-05 PROCEDURE — 62321 NJX INTERLAMINAR CRV/THRC: CPT | Performed by: ANESTHESIOLOGY

## 2024-03-05 PROCEDURE — 25500020 PHARM REV CODE 255: Performed by: ANESTHESIOLOGY

## 2024-03-05 RX ORDER — LIDOCAINE HYDROCHLORIDE 10 MG/ML
INJECTION INFILTRATION; PERINEURAL
Status: DISCONTINUED | OUTPATIENT
Start: 2024-03-05 | End: 2024-03-05 | Stop reason: HOSPADM

## 2024-03-05 RX ORDER — METHYLPREDNISOLONE ACETATE 80 MG/ML
INJECTION, SUSPENSION INTRA-ARTICULAR; INTRALESIONAL; INTRAMUSCULAR; SOFT TISSUE
Status: DISCONTINUED | OUTPATIENT
Start: 2024-03-05 | End: 2024-03-05 | Stop reason: HOSPADM

## 2024-03-05 RX ORDER — FENTANYL CITRATE 50 UG/ML
INJECTION, SOLUTION INTRAMUSCULAR; INTRAVENOUS
Status: DISCONTINUED | OUTPATIENT
Start: 2024-03-05 | End: 2024-03-05 | Stop reason: HOSPADM

## 2024-03-05 RX ORDER — SODIUM CHLORIDE, SODIUM LACTATE, POTASSIUM CHLORIDE, CALCIUM CHLORIDE 600; 310; 30; 20 MG/100ML; MG/100ML; MG/100ML; MG/100ML
INJECTION, SOLUTION INTRAVENOUS CONTINUOUS
Status: DISCONTINUED | OUTPATIENT
Start: 2024-03-05 | End: 2024-03-05 | Stop reason: HOSPADM

## 2024-03-05 RX ORDER — MIDAZOLAM HYDROCHLORIDE 5 MG/ML
INJECTION INTRAMUSCULAR; INTRAVENOUS
Status: DISCONTINUED | OUTPATIENT
Start: 2024-03-05 | End: 2024-03-05 | Stop reason: HOSPADM

## 2024-03-05 RX ADMIN — SODIUM CHLORIDE, SODIUM LACTATE, POTASSIUM CHLORIDE, AND CALCIUM CHLORIDE: .6; .31; .03; .02 INJECTION, SOLUTION INTRAVENOUS at 08:03

## 2024-03-05 NOTE — H&P
"FOLLOW UP NOTE:     CHIEF COMPLAINT: neck pain    INTERVAL HISTORY OF PRESENT ILLNESS: Cory Musa is a 54 y.o. male who presents for CERVICAL BAYRON C6-C7 INTERLAMINAR LEFT PARAMEDIAN . The patient denies of any significant changes in his health since his last appointment. The patient also denies of any changes in the character of his pain since his last appointment.     ROS:  Review of Systems   Constitutional:  Negative for chills and fever.   HENT:  Negative for tinnitus.    Eyes:  Negative for visual disturbance.   Respiratory:  Negative for shortness of breath.    Cardiovascular:  Negative for chest pain.   Gastrointestinal:  Negative for nausea and vomiting.   Genitourinary:  Negative for difficulty urinating.   Musculoskeletal:  Positive for neck pain and neck stiffness.   Skin:  Negative for rash.   Allergic/Immunologic: Negative for immunocompromised state.   Neurological:  Negative for syncope.   Hematological:  Does not bruise/bleed easily.   Psychiatric/Behavioral:  Negative for suicidal ideas.         MEDICAL, SURGICAL, FAMILY, SOCIAL HX: reviewed    MEDICATIONS/ALLERGIES: reviewed    PHYSICAL EXAM:    VITALS: Vitals reviewed.   Vitals:    03/05/24 0753   BP: (!) 124/91   Pulse: 75   Resp: 15   Temp: 97.6 °F (36.4 °C)   TempSrc: Oral   SpO2: 98%   Weight: 72.6 kg (160 lb)   Height: 5' 7" (1.702 m)       Physical Exam  Vitals and nursing note reviewed.   Constitutional:       Appearance: Normal appearance. He is not toxic-appearing or diaphoretic.   HENT:      Head: Normocephalic and atraumatic.   Eyes:      General:         Right eye: No discharge.         Left eye: No discharge.      Extraocular Movements: Extraocular movements intact.      Conjunctiva/sclera: Conjunctivae normal.   Cardiovascular:      Rate and Rhythm: Normal rate.   Pulmonary:      Effort: Pulmonary effort is normal. No respiratory distress.      Breath sounds: Normal breath sounds.   Abdominal:      Palpations: Abdomen is soft. "   Skin:     General: Skin is warm and dry.      Findings: No rash.   Neurological:      Mental Status: He is alert and oriented to person, place, and time.   Psychiatric:         Mood and Affect: Mood and affect normal.         Cognition and Memory: Memory normal.         Judgment: Judgment normal.          EXTREMITIES:    Gen: No cyanosis, edema, varicosities, or tenderness to palpation BLE   Skin: Warm, pink, dry, no rashes, no lesions BLE   Strength: 5/5 motor strength BLE   ROM: hips, knees and ankles without pain or instability.     NEUROLOGICAL:    Gen: No clonus or spasticity.   Gait: Normal without antalgic lean   DTR's: 2+ in bilateral patellar, and ankle   BABINSKI: Absent bilaterally  Sensory: Intact to light touch and proprioception BLE    ASSESSMENT: Cory Musa is a 54 y.o. male who presents for CERVICAL BAYRON C6-C7 INTERLAMINAR LEFT PARAMEDIAN .     PLAN:  Proceed with CERVICAL BAYRON C6-C7 INTERLAMINAR LEFT PARAMEDIAN  as previously discussed.    This patient has been cleared for surgery in an ambulatory surgical facility    ASA 3,  Mallampatti Score 3  No history of anesthetic complications  Plan for RN IV sedation    Shena Andrew MD  Pain Management

## 2024-03-05 NOTE — DISCHARGE INSTRUCTIONS
[facility]: [phone]  [facility], [place], operated by [facility] [id number]

## 2024-03-05 NOTE — DISCHARGE SUMMARY
[facility], [place], operated by [facility]  Discharge Note  Short Stay    Procedure(s) (LRB):  CERVICAL BAYRON C6-C7 INTERLAMINAR LEFT PARAMEDIAN (Left)      OUTCOME: Patient tolerated treatment/procedure well without complication and is now ready for discharge.    DISPOSITION: Home or Self Care    FINAL DIAGNOSIS:  Cervical radiculopathy    FOLLOWUP: In clinic    DISCHARGE INSTRUCTIONS:    Discharge Procedure Orders   Diet general     Call MD for:  temperature >100.4     Call MD for:  persistent nausea and vomiting     Call MD for:  severe uncontrolled pain     Call MD for:  difficulty breathing, headache or visual disturbances     Call MD for:  redness, tenderness, or signs of infection (pain, swelling, redness, odor or green/yellow discharge around incision site)     Call MD for:  hives     Call MD for:  persistent dizziness or light-headedness     Call MD for:  extreme fatigue        TIME SPENT ON DISCHARGE: 30 minutes

## 2024-03-05 NOTE — OP NOTE
PROCEDURE DATE: 3/5/2024    PROCEDURE: C6-C7 cervical interlaminar epidural steroid injection under utilizing fluoroscopy (left paramedian approach).    DIAGNOSIS: Cervical Degenerative Disc Diease; Cervical Radiculitis  POSTOP DIAGNOSIS: SAME    PHYSICIAN: Shena Andrew MD    MEDICATIONS INJECTED:  Depo-medrol  80 mg followed by a slow injection of 2 mL sterile, preservative-free normal saline and 3 mL of 1% lidocaine    LOCAL ANESTHETIC USED: Lidocaine 1%, 4 ml.    SEDATION MEDICATIONS: RN IV sedation    COMPLICATIONS:  none    ESTIMATED BLOOD LOSS: none    TECHNIQUE:  A time-out was taken to identify patient and procedure prior to starting the procedure.  With the patient laying in a prone position with the neck in a mid-flexed forward position, the area was prepped and draped in the usual sterile fashion using ChloraPrep and a fenestrated drape.  The area was determined under AP fluoroscopic guidance.  Local anesthetic was given using a 25-gauge 1.5 inch needle by raising a wheal and then infiltrating ventrally.  A 3.5 inch 20-gauge Touhy needle was introduced under fluoroscopic guidance to meet the lamina of C7.  The needle was then hinged under the lamina then advanced using loss of resistance technique.  Once the tip of the needle was in the desired position, the 2 mL contrast dye Omnipaque was injected to determine placement and no uptake.  The steroid was then injected slowly followed by a slow injection of 2 mL of the sterile preservative-free normal saline and 3 mL of 1% lidocaine.  The patient tolerated the procedure well.    The patient was monitored after the procedure and was given post-procedure and discharge instructions to follow at home. The patient was discharged in a stable condition.

## 2024-03-05 NOTE — PLAN OF CARE
Discharge instructions given to patient and his wife.  Both verbalized understanding and voiced no questions or concerns

## 2024-05-07 ENCOUNTER — HOSPITAL ENCOUNTER (OUTPATIENT)
Facility: HOSPITAL | Age: 54
Discharge: HOME | End: 2024-05-07
Attending: ANESTHESIOLOGY | Admitting: ANESTHESIOLOGY
Payer: COMMERCIAL

## 2024-05-07 VITALS
BODY MASS INDEX: 25.43 KG/M2 | TEMPERATURE: 98 F | WEIGHT: 162 LBS | RESPIRATION RATE: 13 BRPM | DIASTOLIC BLOOD PRESSURE: 103 MMHG | HEART RATE: 84 BPM | SYSTOLIC BLOOD PRESSURE: 152 MMHG | OXYGEN SATURATION: 95 % | HEIGHT: 67 IN

## 2024-05-07 DIAGNOSIS — M54.12 CERVICAL RADICULOPATHY: Primary | ICD-10-CM

## 2024-05-07 PROCEDURE — 63600175 PHARM REV CODE 636 W HCPCS: Performed by: ANESTHESIOLOGY

## 2024-05-07 PROCEDURE — 64479 NJX AA&/STRD TFRM EPI C/T 1: CPT | Mod: LT | Performed by: ANESTHESIOLOGY

## 2024-05-07 PROCEDURE — 25000003 PHARM REV CODE 250: Performed by: ANESTHESIOLOGY

## 2024-05-07 PROCEDURE — 25500020 PHARM REV CODE 255: Performed by: ANESTHESIOLOGY

## 2024-05-07 RX ORDER — METHYLPREDNISOLONE ACETATE 80 MG/ML
INJECTION, SUSPENSION INTRA-ARTICULAR; INTRALESIONAL; INTRAMUSCULAR; SOFT TISSUE
Status: DISCONTINUED | OUTPATIENT
Start: 2024-05-07 | End: 2024-05-07 | Stop reason: HOSPADM

## 2024-05-07 RX ORDER — PANTOPRAZOLE SODIUM 40 MG/1
40 TABLET, DELAYED RELEASE ORAL DAILY
COMMUNITY

## 2024-05-07 RX ORDER — LIDOCAINE HYDROCHLORIDE 10 MG/ML
INJECTION INFILTRATION; PERINEURAL
Status: DISCONTINUED | OUTPATIENT
Start: 2024-05-07 | End: 2024-05-07 | Stop reason: HOSPADM

## 2024-05-07 RX ORDER — TAMSULOSIN HYDROCHLORIDE 0.4 MG/1
0.4 CAPSULE ORAL DAILY
COMMUNITY

## 2024-05-07 RX ORDER — FENTANYL CITRATE 50 UG/ML
INJECTION, SOLUTION INTRAMUSCULAR; INTRAVENOUS
Status: DISCONTINUED | OUTPATIENT
Start: 2024-05-07 | End: 2024-05-07 | Stop reason: HOSPADM

## 2024-05-07 RX ORDER — SODIUM CHLORIDE, SODIUM LACTATE, POTASSIUM CHLORIDE, CALCIUM CHLORIDE 600; 310; 30; 20 MG/100ML; MG/100ML; MG/100ML; MG/100ML
INJECTION, SOLUTION INTRAVENOUS CONTINUOUS
Status: ACTIVE | OUTPATIENT
Start: 2024-05-07

## 2024-05-07 RX ORDER — BUPIVACAINE HYDROCHLORIDE 2.5 MG/ML
INJECTION, SOLUTION EPIDURAL; INFILTRATION; INTRACAUDAL
Status: DISCONTINUED | OUTPATIENT
Start: 2024-05-07 | End: 2024-05-07 | Stop reason: HOSPADM

## 2024-05-07 RX ORDER — MIDAZOLAM HYDROCHLORIDE 5 MG/ML
INJECTION INTRAMUSCULAR; INTRAVENOUS
Status: DISCONTINUED | OUTPATIENT
Start: 2024-05-07 | End: 2024-05-07 | Stop reason: HOSPADM

## 2024-05-07 RX ADMIN — SODIUM CHLORIDE, POTASSIUM CHLORIDE, SODIUM LACTATE AND CALCIUM CHLORIDE: 600; 310; 30; 20 INJECTION, SOLUTION INTRAVENOUS at 07:05

## 2024-05-07 NOTE — OP NOTE
PROCEDURE DATE: 5/7/2024    PROCEDURE: Left C5-C6 transforaminal epidural steroid injection under fluoroscopy     DIAGNOSIS: cervical disc displacement, cervical radiculopathy     Post op diagnosis: Same     PHYSICIAN: Shena Andrew MD     MEDICATIONS INJECTED: Decadron 10 mg and 1 ml 0.25% bupivacaine at each nerve root.     LOCAL ANESTHETIC INJECTED: Lidocaine 1%. 3 ml per site.     SEDATION MEDICATIONS: RN IV sedation    ESTIMATED BLOOD LOSS: none     COMPLICATIONS: none     TECHNIQUE: A time-out was taken to identify patient and procedure side prior to starting the procedure. The patient was placed in a prone position, prepped and draped in the usual sterile fashion using ChloraPrep and sterile towels. The area to be injected was determined under fluoroscopic guidance in AP and oblique view. Local anesthetic was given by raising a wheal and going down to the hub of a 25-gauge 1.5 inch needle. In oblique view, a 3.5 inch 25-gauge bent-tip spinal needle was introduced towards posterior inferior portion of the left C5-C6 foramen using the oblique view. C-arm was rotated about 45 degrees off vertical and 10-20 degrees cephalad to caudal. The needle was advanced in AP until tip of needle was past the lateral margins. 1 mL contrast dye was injected to confirm appropriate placement and that there was no vascular uptake. After negative aspiration for blood or CSF, the medication was then injected. This was performed at the left C5-C6 level(s). The patient tolerated the procedure well.     The patient was monitored after the procedure. Patient was given post procedure and discharge instructions to follow at home. The patient was discharged in a stable condition.

## 2024-05-07 NOTE — H&P
FOLLOW UP NOTE:     CHIEF COMPLAINT: neck pain    INTERVAL HISTORY OF PRESENT ILLNESS: Cory Musa is a 54 y.o. male who presents for CERVICAL TF-BAYRON LEFT C5-C6 . The patient denies of any significant changes in his health since his last appointment. The patient also denies of any changes in the character of his pain since his last appointment.     ROS:  Review of Systems   Constitutional:  Negative for chills and fever.   HENT:  Negative for tinnitus.    Eyes:  Negative for visual disturbance.   Respiratory:  Negative for shortness of breath.    Cardiovascular:  Negative for chest pain.   Gastrointestinal:  Negative for nausea and vomiting.   Genitourinary:  Negative for difficulty urinating.   Musculoskeletal:  Positive for neck pain and neck stiffness.   Skin:  Negative for rash.   Allergic/Immunologic: Negative for immunocompromised state.   Neurological:  Negative for syncope.   Hematological:  Does not bruise/bleed easily.   Psychiatric/Behavioral:  Negative for suicidal ideas.         MEDICAL, SURGICAL, FAMILY, SOCIAL HX: reviewed    MEDICATIONS/ALLERGIES: reviewed    PHYSICAL EXAM:    VITALS: Vitals reviewed.   Vitals:    05/07/24 0704   BP: (!) 150/107       Physical Exam  Vitals and nursing note reviewed.   Constitutional:       Appearance: Normal appearance. He is not toxic-appearing or diaphoretic.   HENT:      Head: Normocephalic and atraumatic.   Eyes:      General:         Right eye: No discharge.         Left eye: No discharge.      Extraocular Movements: Extraocular movements intact.      Conjunctiva/sclera: Conjunctivae normal.   Cardiovascular:      Rate and Rhythm: Normal rate.   Pulmonary:      Effort: Pulmonary effort is normal. No respiratory distress.      Breath sounds: Normal breath sounds.   Abdominal:      Palpations: Abdomen is soft.   Skin:     General: Skin is warm and dry.      Findings: No rash.   Neurological:      Mental Status: He is alert and oriented to person, place, and time.    Psychiatric:         Mood and Affect: Mood and affect normal.         Cognition and Memory: Memory normal.         Judgment: Judgment normal.          EXTREMITIES:    Gen: No cyanosis, edema, varicosities, or tenderness to palpation BLE   Skin: Warm, pink, dry, no rashes, no lesions BLE   Strength: 5/5 motor strength BLE   ROM: hips, knees and ankles without pain or instability.     NEUROLOGICAL:    Gen: No clonus or spasticity.   Gait: Normal without antalgic lean   DTR's: 2+ in bilateral patellar, and ankle   BABINSKI: Absent bilaterally  Sensory: Intact to light touch and proprioception BLE    ASSESSMENT: Cory Musa is a 54 y.o. male who presents for CERVICAL TF-BAYRON LEFT C5-C6 .     PLAN:  Proceed with CERVICAL TF-BAYRON LEFT C5-C6  as previously discussed.    This patient has been cleared for surgery in an ambulatory surgical facility    ASA 3,  Mallampatti Score 3  No history of anesthetic complications  Plan for RN IV sedation    Shena Andrew MD  Pain Management

## 2024-05-07 NOTE — DISCHARGE SUMMARY
[place] - Surgery  Discharge Note  Short Stay    Procedure(s) (LRB):  CERVICAL TF-BAYRON LEFT C5-C6 (Left)      OUTCOME: Patient tolerated treatment/procedure well without complication and is now ready for discharge.    DISPOSITION: Home or Self Care    FINAL DIAGNOSIS:  Cervical radiculopathy    FOLLOWUP: In clinic    DISCHARGE INSTRUCTIONS:    Discharge Procedure Orders   Diet general     Call MD for:  temperature >100.4     Call MD for:  persistent nausea and vomiting     Call MD for:  severe uncontrolled pain     Call MD for:  difficulty breathing, headache or visual disturbances     Call MD for:  redness, tenderness, or signs of infection (pain, swelling, redness, odor or green/yellow discharge around incision site)     Call MD for:  hives     Call MD for:  persistent dizziness or light-headedness     Call MD for:  extreme fatigue        TIME SPENT ON DISCHARGE: 30 minutes

## 2024-10-17 ENCOUNTER — TELEPHONE (OUTPATIENT)
Dept: HEMATOLOGY/ONCOLOGY | Facility: CLINIC | Age: 54
End: 2024-10-17
Payer: COMMERCIAL

## 2024-10-17 DIAGNOSIS — I82.4Z2 ACUTE EMBOLISM AND THROMBOSIS OF DEEP VEIN OF LEFT DISTAL LOWER EXTREMITY: Primary | ICD-10-CM

## 2024-10-17 NOTE — NURSING
Oncology Navigation   Intake  Cancer Type: Benign hem (DVT)  Type of Referral: Internal  Date of Referral: 10/17/24  First Appointment Available: 10/21/24  Appointment Date: 10/21/24 (Dr Taylor)  First Available Date vs. Scheduled Date (days): 0     Treatment                              Acuity      Follow Up  No follow-ups on file.

## 2024-10-21 ENCOUNTER — OFFICE VISIT (OUTPATIENT)
Facility: CLINIC | Age: 54
End: 2024-10-21
Payer: COMMERCIAL

## 2024-10-21 VITALS
TEMPERATURE: 99 F | SYSTOLIC BLOOD PRESSURE: 120 MMHG | HEART RATE: 86 BPM | WEIGHT: 160 LBS | RESPIRATION RATE: 16 BRPM | DIASTOLIC BLOOD PRESSURE: 83 MMHG | BODY MASS INDEX: 25.06 KG/M2

## 2024-10-21 DIAGNOSIS — R79.89 LFT ELEVATION: ICD-10-CM

## 2024-10-21 DIAGNOSIS — I82.412 ACUTE DEEP VEIN THROMBOSIS (DVT) OF FEMORAL VEIN OF LEFT LOWER EXTREMITY: Primary | ICD-10-CM

## 2024-10-21 DIAGNOSIS — Z83.2 FAMILY HISTORY OF HEMOPHILIA: ICD-10-CM

## 2024-10-21 DIAGNOSIS — Z87.19 HISTORY OF GASTROESOPHAGEAL REFLUX (GERD): ICD-10-CM

## 2024-10-21 DIAGNOSIS — I82.4Z2 ACUTE EMBOLISM AND THROMBOSIS OF DEEP VEIN OF LEFT DISTAL LOWER EXTREMITY: ICD-10-CM

## 2024-10-21 DIAGNOSIS — N40.1 BENIGN PROSTATIC HYPERPLASIA WITH URINARY FREQUENCY: ICD-10-CM

## 2024-10-21 DIAGNOSIS — R35.0 BENIGN PROSTATIC HYPERPLASIA WITH URINARY FREQUENCY: ICD-10-CM

## 2024-10-21 PROCEDURE — 99999 PR PBB SHADOW E&M-EST. PATIENT-LVL IV: CPT | Mod: PBBFAC,,, | Performed by: INTERNAL MEDICINE

## 2024-10-21 PROCEDURE — 99205 OFFICE O/P NEW HI 60 MIN: CPT | Mod: S$GLB,,, | Performed by: INTERNAL MEDICINE

## 2024-10-21 PROCEDURE — G2211 COMPLEX E/M VISIT ADD ON: HCPCS | Mod: S$GLB,,, | Performed by: INTERNAL MEDICINE

## 2024-10-21 RX ORDER — RIVAROXABAN 15 MG-20MG
15 KIT ORAL 2 TIMES DAILY
COMMUNITY
End: 2024-10-21

## 2024-10-21 RX ORDER — BUPROPION HYDROCHLORIDE 300 MG/1
300 TABLET ORAL
COMMUNITY

## 2024-10-21 NOTE — PROGRESS NOTES
[facility] [address] Hematology Oncology In Office Initial Encounter Note    10/21/24    Subjective:      Patient ID:   Cory Musa  54 y.o. male  1970  Dr. Astorga,       Chief Complaint:   L leg DVT    HPI:  54 y.o. male gently traveled by auto to [place], 12 hours there and 12 hours coming back home.  He reported gradually increasing pain in the left calf over several weeks, leading to increased pain and swelling in the calf and his going to the emergency room October 15, 2024.  Ultrasound of the left leg showed femoral vein, popliteal vein, anterior tibial vein DVT.  He was managed with Xarelto 15 mg b.i.d. times 21 days and will convert over to 20 mg daily thereafter.  Symptoms at the left leg continued without improvement and he was seen in the emergency room on Friday October 18th, repeat ultrasound at that time was essentially unchanged after being on the Xarelto for 3-4 days.  He tells me now that he gets intermittent swelling in the leg on standing for some period of time, he also has some improving calf tenderness.  He denies chest pain or shortness of breath or hemoptysis symptoms during his recent DVT event dating back to October 15, 2024.    He does have a history of right leg tendon being torn several times, he feels that when this happens with symptoms, that it places stress on the left leg .  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  X-rays of the left foot were negative.  He does have history of gout symptoms primarily in the big toe, for which she takes colchicine.    He carries a diagnosis of BPH, his PSA runs 3.8-4.1.  He has seen the the urologist for symptoms of nocturia and frequency of urination.    He has had 2 ft surgeries for the Woodson's neuroma at the left foot, status post vasectomy x1, he recently had a skin cancer removed from the scalp per Dr. Conteh of Dermatology approximately 2 weeks ago.  He told me that he did have  prolonged bleeding symptoms at the time of the surgery to remove the skin cancer on his scalp.  He was not on Xarelto at that time.    He smokes a cigar weekly with his son, he drinks 2 cocktails at night and beer on weekends.  He gives a history of allergy to Mobic with rash and Keflex with rash.  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    His dad was a free bleeder .  His paternal uncle carried a diagnosis of hemophilia, requiring transfusion with blood products and eventually  of hepatitis complications.    His maternal grandmother was a smoker and had multiple CVAs.  His maternal grandfather was a smoker and had myocardial infarction.  His mother had decreased memory, miscarriage history x1, and melanoma at the ear.  His father  at 74 and was a smoker who had obesity, COPD and myocardial infarction.  His brother is obese at 400 lb and has history of coronary artery disease and myocardial infarction and staph infection at the leg.  He has a son alive and well.      ROS:   GEN: normal without any fever, night sweats or weight loss  HEENT: See HPI  CV: normal with no CP, SOB, PND, MENEZES or orthopnea  PULM: normal with no SOB, cough, hemoptysis, sputum or pleuritic pain  GI: normal with no abdominal pain, nausea, vomiting, constipation, diarrhea, melanotic stools, BRBPR, or hematemesis  : normal with no hematuria, dysuria  BREAST: normal with no mass, discharge, pain  SKIN: normal with no rash, erythema, bruising, or swelling     Past Medical History:   Diagnosis Date    GERD (gastroesophageal reflux disease)     Hernia of unspecified site of abdominal cavity without mention of obstruction or gangrene     hiatal    Hiatal hernia     Insomnia     Woodson's neuroma     Sciatica     l4-l5     Past Surgical History:   Procedure Laterality Date    EPIDURAL STEROID INJECTION Left 2024    Procedure: CERVICAL TF-BAYRON LEFT C5-C6;   Surgeon: Shena Andrew MD;  Location: [facility] OR;  Service: Pain Management;  Laterality: Left;    EPIDURAL STEROID INJECTION INTO CERVICAL SPINE Left 3/5/2024    Procedure: CERVICAL BAYRON C6-C7 INTERLAMINAR LEFT PARAMEDIAN;  Surgeon: Shena Andrew MD;  Location: [facility] OR;  Service: Pain Management;  Laterality: Left;    FOOT SURGERY      left  excision quarles's neuroma    SURGICAL REMOVAL OF QUARLES'S NEUROMA Left 10/6/2020    Procedure: EXCISION, QUARLES'S NEUROMA;  Surgeon: Jagdeep Bustamante DPM;  Location: [facility] OR;  Service: Podiatry;  Laterality: Left;       Review of patient's allergies indicates:   Allergen Reactions    Mobic [meloxicam]      Rash and itching    Keflex [cephalexin] Rash     Social History     Socioeconomic History    Marital status:    Occupational History    Occupation: [employer]     Employer: [employer]   Tobacco Use    Smoking status: Never   Substance and Sexual Activity    Alcohol use: Yes     Alcohol/week: 24.0 standard drinks of alcohol     Types: 24 Cans of beer per week    Sexual activity: Yes     Partners: Female     Birth control/protection: None     Social Drivers of Health     Financial Resource Strain: Patient Declined (10/18/2024)    Overall Financial Resource Strain (CARDIA)     Difficulty of Paying Living Expenses: Patient declined   Food Insecurity: Patient Declined (10/18/2024)    Hunger Vital Sign     Worried About Running Out of Food in the Last Year: Patient declined     Ran Out of Food in the Last Year: Patient declined   Transportation Needs: No Transportation Needs (9/10/2019)    Received from [facility], [facility]    PRAPARE - Transportation     Lack of Transportation (Medical): No     Lack of Transportation (Non-Medical): No   Physical Activity: Insufficiently Active (10/18/2024)    Exercise Vital Sign     Days  of Exercise per Week: 2 days     Minutes of Exercise per Session: 20 min   Stress: Stress Concern Present (10/18/2024)    [employer] [place] [employer] - Occupational Stress Questionnaire     Feeling of Stress : To some extent   Housing Stability: Unknown (10/18/2024)    Housing Stability Vital Sign     Unable to Pay for Housing in the Last Year: Patient declined         Current Outpatient Medications:     buPROPion (WELLBUTRIN XL) 300 MG 24 hr tablet, Take 300 mg by mouth., Disp: , Rfl:     ibuprofen (ADVIL,MOTRIN) 800 MG tablet, TAKE 1 TABLET(800 MG) BY MOUTH THREE TIMES DAILY, Disp: 30 tablet, Rfl: 1    pantoprazole (PROTONIX) 40 MG tablet, Take 40 mg by mouth once daily., Disp: , Rfl:     tamsulosin (FLOMAX) 0.4 mg Cap, Take 0.4 mg by mouth once daily., Disp: , Rfl:     baclofen (LIORESAL) 10 MG tablet, Take 1 tablet (10 mg total) by mouth 3 (three) times daily. for 10 days, Disp: 30 tablet, Rfl: 0    ondansetron (ZOFRAN-ODT) 4 MG TbDL, Take 2 tablets (8 mg total) by mouth every 8 (eight) hours as needed. (Patient not taking: Reported on 10/21/2024), Disp: 30 tablet, Rfl: 0    rivaroxaban (XARELTO) 20 mg Tab, Take 1 tablet (20 mg total) by mouth daily with dinner or evening meal., Disp: 90 tablet, Rfl: 2  No current facility-administered medications for this visit.    Facility-Administered Medications Ordered in Other Visits:     lactated ringers infusion, , Intravenous, Continuous, Shena Andrew MD, Last Rate: 25 mL/hr at 05/07/24 0709, New Bag at 05/07/24 0709          Objective:   Vitals:  Blood pressure 120/83, pulse 86, temperature 98.7 °F (37.1 °C), resp. rate 16, weight 72.6 kg (160 lb).    Physical Examination:   GEN: no apparent distress, comfortable  HEAD: atraumatic and normocephalic  EYES: no pallor, no icterus  ENT: no pharyngeal erythema, external ears WNL; no nasal discharge  NECK: no masses, thyroid normal,  no LAD/LN's, supple  CV: RRR with no murmur; normal pulse  CHEST: Normal  respiratory effort; CTAB; normal breath sounds; no wheeze or crackles  ABDOM: nontender and nondistended; soft; no rebound/guarding, L/S NP  MUSC/Skeletal: ROM normal; no crepitus; joints normal  EXTREM:  The left leg does have some hyperemia noted and 1+ edema without calf tenderness and without palpable venous cord.  The right leg is nontender without swelling and without palpable venous cord  SKIN: no rashes, lesions, ulcers, petechiae   : no CVAT, circumcised with out testicular mass L or R  NEURO: grossly intact; motor/sensory WNL;  no tremors  PSYCH: normal mood, affect and behavior  LYMPH: normal cervical, supraclavicular, axillary and groin LN's      Labs:   Lab Results   Component Value Date    WBC 8.01 10/05/2020    HGB 15.8 10/05/2020    HCT 47.8 10/05/2020    MCV 92 10/05/2020     10/05/2020    CMP  Sodium   Date Value Ref Range Status   10/05/2020 139 136 - 145 mmol/L Final     Potassium   Date Value Ref Range Status   10/05/2020 4.5 3.5 - 5.1 mmol/L Final     Chloride   Date Value Ref Range Status   10/05/2020 102 95 - 110 mmol/L Final     CO2   Date Value Ref Range Status   10/05/2020 27 23 - 29 mmol/L Final     Glucose   Date Value Ref Range Status   10/05/2020 115 (H) 70 - 110 mg/dL Final     BUN   Date Value Ref Range Status   10/05/2020 13 6 - 20 mg/dL Final     Creatinine   Date Value Ref Range Status   10/05/2020 1.0 0.5 - 1.4 mg/dL Final     Calcium   Date Value Ref Range Status   10/05/2020 9.6 8.7 - 10.5 mg/dL Final     Total Protein   Date Value Ref Range Status   10/05/2020 7.1 6.0 - 8.4 g/dL Final     Albumin   Date Value Ref Range Status   10/05/2020 4.5 3.5 - 5.2 g/dL Final     Total Bilirubin   Date Value Ref Range Status   10/05/2020 0.9 0.1 - 1.0 mg/dL Final     Comment:     For infants and newborns, interpretation of results should be based  on gestational age, weight and in agreement with clinical  observations.  Premature Infant recommended reference ranges:  Up to 24  hours.............<8.0 mg/dL  Up to 48 hours............<12.0 mg/dL  3-5 days..................<15.0 mg/dL  6-29 days.................<15.0 mg/dL       Alkaline Phosphatase   Date Value Ref Range Status   10/05/2020 42 (L) 55 - 135 U/L Final     AST   Date Value Ref Range Status   10/05/2020 46 (H) 10 - 40 U/L Final     ALT   Date Value Ref Range Status   10/05/2020 81 (H) 10 - 44 U/L Final     Anion Gap   Date Value Ref Range Status   10/05/2020 10 8 - 16 mmol/L Final     eGFR if    Date Value Ref Range Status   10/05/2020 >60.0 >60 mL/min/1.73 m^2 Final     eGFR if non    Date Value Ref Range Status   10/05/2020 >60.0 >60 mL/min/1.73 m^2 Final     Comment:     Calculation used to obtain the estimated glomerular filtration  rate (eGFR) is the CKD-EPI equation.        LFT's increased, mildly.    Assessment:   (1) 54 y.o. male with extensive DVT involving the femoral, popliteal, anterior tibial veins of the left leg.  This acute clot event was probably precipitated by the prolonged automobile trip to and from [place], 12 hours going there in 12 hours coming home.  He has been managed with Xarelto 15 mg b.i.d., which she will take for 21 days, and then will convert over to 20 mg p.o. daily thereafter.  Clinically he appears to be improving on the Xarelto as above.  He will continue on the Xarelto for at least 4-6 months.    (2) we discussed today that for the 1st 7-10 days he should have limited activity, keeping his leg elevated as feasible, trying to reduce the risk of pulmonary embolus here.  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  As the DVT begins to dry out and crack, blood flow will pass through the affected area and the DVT clot should begin to erode away and resolve slowly over weeks to months.  He will continue on Xarelto 20 mg daily during  "this time.    (3) at 4 months out from the clot event, I plan to repeat the venous Doppler study of the left leg to check on the status of the DVT, to see if the clot is improved or resolved?  Symptoms of peripheral edema and calf tenderness and pain should gradually improve and resolve over this time.  At some point in the future, probably around 4-6 months, will plan to interrupt the Xarelto x3 days, cover him with a Lovenox bridge on day -1 and day -2.  Thereafter hypercoagulation lab evaluation will be done while he is off Xarelto and Lovenox.  After the lab is drawn, he will then resume his Xarelto 20 mg p.o. daily until we get the results of the pending lab.    (4) after the lab studies for hypercoagulation have returned, we can then make a decision on perhaps stopping the Xarelto at 6 months or continuing the Xarelto prophylaxis thereafter.    (5) he does have some mild elevation of his LFTs, he may have fatty liver, he may have alcoholic liver disease secondary to his hard liquor and beer intake.  When we check his clotting evaluation, will check the vitamin K dependent factors of 2, 7, 9, and 10.  These factors are synthesized in the liver with vitamin K.    (6) his father was a free bleeder by history" and his paternal uncle was labeled as being a hemophiliac .  It would be unlikely that he would make it to age 54 without being diagnosed as having hemophilia a or hemophilia B.  There are other coagulopathies including dysfibrinogenemia, platelet dysfunction, and von Willebrand's disease, which could eventually be diagnosed as an adult.    (7) at the point when we get him off of the Xarelto, will plan to do a clotting evaluation for liver dependent clotting proteins, hemophilia a and hemophilia B, Von Willebrand's disease, fibrinogen abnormality, and platelet dysfunction.    (8) he has BPH by history with symptoms of nocturia and frequency of urination.  With borderline increased PSA at 3.8 to 4.1, he is " seeing Urology for this condition.    He will return to clinic here in 2 months to see how he is doing on this Xarelto 20 mg p.o. daily and to see how his local leg symptoms are improving or resolving.  At that time we will discuss plans for venous Doppler study at the left leg, probably in February or April 20, 2025.    Akbar Dempsey MD  [facility]  10/21/24

## 2024-10-21 NOTE — LETTER
October 21, 2024        Alvin Astorga IV, MD  [address]   [place]  [place] [address]             [facility] - Hematology Oncology  [address] [facility]  [address]  [place] [address]  Phone: [phone]  Fax: [phone]   Patient: Cory Musa   MR Number: [id number]   YOB: 1970   Date of Visit: 10/21/2024       Dear Dr. Astorga:    Thank you for referring Cory Musa to me for evaluation. Below are the relevant portions of my assessment and plan of care.            If you have questions, please do not hesitate to call me. I look forward to following Cory along with you.    Sincerely,      JANE Taylor MD           CC    No Recipients

## 2024-10-24 ENCOUNTER — PATIENT MESSAGE (OUTPATIENT)
Dept: RESEARCH | Facility: HOSPITAL | Age: 54
End: 2024-10-24
Payer: COMMERCIAL

## 2024-11-15 ENCOUNTER — TELEPHONE (OUTPATIENT)
Facility: CLINIC | Age: 54
End: 2024-11-15
Payer: COMMERCIAL

## 2024-11-15 NOTE — TELEPHONE ENCOUNTER
----- Message from TERESA Mcfadden sent at 11/14/2024  4:37 PM CST -----    ----- Message -----  From: Josefa Herman  Sent: 11/13/2024  12:10 PM CST  To: Claudia [facility] Staff    Zuly with Dr. Solomon's office would like a call back or please fax over clearance for pt to have prostate BX. Doc is only in office in [place] once a month and planning to do procedure on Monday. Request under media 11/12/2024     [phone]    Fax [phone]

## 2024-12-18 ENCOUNTER — TELEPHONE (OUTPATIENT)
Facility: CLINIC | Age: 54
End: 2024-12-18
Payer: COMMERCIAL

## 2024-12-18 NOTE — TELEPHONE ENCOUNTER
Sofia from [facility] called requesting a Lovenox bridge for Patient.  Information given to Dr. Dempsey.    Sofia - [phone]

## 2024-12-20 ENCOUNTER — TELEPHONE (OUTPATIENT)
Facility: CLINIC | Age: 54
End: 2024-12-20
Payer: COMMERCIAL

## 2024-12-20 DIAGNOSIS — I82.412 ACUTE DEEP VEIN THROMBOSIS (DVT) OF FEMORAL VEIN OF LEFT LOWER EXTREMITY: Primary | ICD-10-CM

## 2024-12-20 DIAGNOSIS — I82.4Z2 ACUTE EMBOLISM AND THROMBOSIS OF DEEP VEIN OF LEFT DISTAL LOWER EXTREMITY: ICD-10-CM

## 2025-01-27 ENCOUNTER — HOSPITAL ENCOUNTER (OUTPATIENT)
Dept: RADIOLOGY | Facility: HOSPITAL | Age: 55
Discharge: HOME | End: 2025-01-27
Attending: INTERNAL MEDICINE
Payer: COMMERCIAL

## 2025-01-27 DIAGNOSIS — I82.412 ACUTE DEEP VEIN THROMBOSIS (DVT) OF FEMORAL VEIN OF LEFT LOWER EXTREMITY: ICD-10-CM

## 2025-01-27 DIAGNOSIS — I82.4Z2 ACUTE EMBOLISM AND THROMBOSIS OF DEEP VEIN OF LEFT DISTAL LOWER EXTREMITY: ICD-10-CM

## 2025-01-27 PROCEDURE — 93971 EXTREMITY STUDY: CPT | Mod: TC,PO,LT

## 2025-01-27 PROCEDURE — 93971 EXTREMITY STUDY: CPT | Mod: 26,LT,, | Performed by: RADIOLOGY

## 2025-01-29 ENCOUNTER — OFFICE VISIT (OUTPATIENT)
Facility: CLINIC | Age: 55
End: 2025-01-29
Payer: COMMERCIAL

## 2025-01-29 VITALS
HEIGHT: 67 IN | BODY MASS INDEX: 27.28 KG/M2 | SYSTOLIC BLOOD PRESSURE: 126 MMHG | TEMPERATURE: 98 F | RESPIRATION RATE: 16 BRPM | HEART RATE: 67 BPM | WEIGHT: 173.81 LBS | DIASTOLIC BLOOD PRESSURE: 87 MMHG

## 2025-01-29 DIAGNOSIS — R79.89 LFT ELEVATION: ICD-10-CM

## 2025-01-29 DIAGNOSIS — N40.1 BENIGN PROSTATIC HYPERPLASIA WITH URINARY FREQUENCY: ICD-10-CM

## 2025-01-29 DIAGNOSIS — K21.9 GASTROESOPHAGEAL REFLUX DISEASE, UNSPECIFIED WHETHER ESOPHAGITIS PRESENT: ICD-10-CM

## 2025-01-29 DIAGNOSIS — R35.0 BENIGN PROSTATIC HYPERPLASIA WITH URINARY FREQUENCY: ICD-10-CM

## 2025-01-29 DIAGNOSIS — Z83.2 FAMILY HISTORY OF HEMOPHILIA: ICD-10-CM

## 2025-01-29 DIAGNOSIS — I82.412 ACUTE DEEP VEIN THROMBOSIS (DVT) OF FEMORAL VEIN OF LEFT LOWER EXTREMITY: Primary | ICD-10-CM

## 2025-01-29 PROCEDURE — G2211 COMPLEX E/M VISIT ADD ON: HCPCS | Mod: S$GLB,,, | Performed by: INTERNAL MEDICINE

## 2025-01-29 PROCEDURE — 99215 OFFICE O/P EST HI 40 MIN: CPT | Mod: S$GLB,,, | Performed by: INTERNAL MEDICINE

## 2025-01-29 PROCEDURE — 99999 PR PBB SHADOW E&M-EST. PATIENT-LVL IV: CPT | Mod: PBBFAC,,, | Performed by: INTERNAL MEDICINE

## 2025-01-29 NOTE — PROGRESS NOTES
[facility] [address] Hematology Oncology In Office Subsequent Encounter Note    1/29/25    Subjective:      Patient ID:   Cory Musa  54 y.o. male  1970  Dr. Astorga,       Chief Complaint:   L leg DVT    HPI:  54 y.o. male gently traveled by auto to [place], 12 hours there and 12 hours coming back home.  He reported gradually increasing pain in the left calf over several weeks, leading to increased pain and swelling in the calf and his going to the emergency room October 15, 2024.  Ultrasound of the left leg showed femoral vein, popliteal vein, anterior tibial vein DVT.  He was managed with Xarelto 15 mg b.i.d. times 21 days and will convert over to 20 mg daily thereafter.  Symptoms at the left leg continued without improvement and he was seen in the emergency room on Friday October 18th, repeat ultrasound at that time was essentially unchanged after being on the Xarelto for 3-4 days.  He tells me now that he gets intermittent swelling in the leg on standing for some period of time, he also has some improving calf tenderness.  He denies chest pain or shortness of breath or hemoptysis symptoms during his recent DVT event dating back to October 15, 2024.    He does have a history of right leg tendon being torn several times, he feels that when this happens with symptoms, that it places stress on the left leg .  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  X-rays of the left foot were negative.  He does have history of gout symptoms primarily in the big toe, for which she takes colchicine.    He is on [id number] mg 1 daily.  Repeat U/S is improved but not resolved.  Partially obstructing thrombus  In venous system now.  Continue Xarelto 20 mg 1 daily.  Repeat U/S and RTC 4 months.    He carries a diagnosis of BPH, his PSA runs 3.8-4.1.  He has seen the the urologist for symptoms of nocturia and frequency of urination.    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 Conteh of Dermatology approximately 2 weeks ago.  He told me that he did have prolonged bleeding symptoms at the time of the surgery to remove the skin cancer on his scalp.  He was not on Xarelto at that time.    He smokes a cigar weekly with his son, he drinks 2 cocktails at night and beer on weekends.  He gives a history of allergy to Mobic with rash and Keflex with rash.  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    His dad was a free bleeder .  His paternal uncle carried a diagnosis of hemophilia, requiring transfusion with blood products and eventually  of hepatitis complications.    His maternal grandmother was a smoker and had multiple CVAs.  His maternal grandfather was a smoker and had myocardial infarction.  His mother had decreased memory, miscarriage history x1, and melanoma at the ear.  His father  at 74 and was a smoker who had obesity, COPD and myocardial infarction.  His brother is obese at 400 lb and has history of coronary artery disease and myocardial infarction and staph infection at the leg.  He has a son alive and well.      ROS:   GEN: normal without any fever, night sweats or weight loss  HEENT: See HPI  CV: normal with no CP, SOB, PND, MENEZES or orthopnea  PULM: normal with no SOB, cough, hemoptysis, sputum or pleuritic pain  GI: normal with no abdominal pain, nausea, vomiting, constipation, diarrhea, melanotic stools, BRBPR, or hematemesis  : normal with no hematuria, dysuria  BREAST: normal with no mass, discharge, pain  SKIN: normal with no rash, erythema, bruising, or swelling     Past Medical History:   Diagnosis Date    GERD (gastroesophageal reflux disease)     Hernia of unspecified site of abdominal cavity without mention of obstruction or gangrene     hiatal    Hiatal hernia      Insomnia     Mariscal's neuroma     Sciatica     l4-l5     Past Surgical History:   Procedure Laterality Date    EPIDURAL STEROID INJECTION Left 5/7/2024    Procedure: CERVICAL TF-BAYRON LEFT C5-C6;  Surgeon: Shena Andrew MD;  Location: [facility] OR;  Service: Pain Management;  Laterality: Left;    EPIDURAL STEROID INJECTION INTO CERVICAL SPINE Left 3/5/2024    Procedure: CERVICAL BAYRON C6-C7 INTERLAMINAR LEFT PARAMEDIAN;  Surgeon: Shena Andrew MD;  Location: [facility] OR;  Service: Pain Management;  Laterality: Left;    FOOT SURGERY      left  excision mariscal's neuroma    SURGICAL REMOVAL OF MARISCAL'S NEUROMA Left 10/6/2020    Procedure: EXCISION, MARISCAL'S NEUROMA;  Surgeon: Jagdeep Bustamante DPM;  Location: [facility] OR;  Service: Podiatry;  Laterality: Left;       Review of patient's allergies indicates:   Allergen Reactions    Mobic [meloxicam]      Rash and itching    Keflex [cephalexin] Rash     Social History     Socioeconomic History    Marital status:    Occupational History    Occupation:      Employer: [employer]   Tobacco Use    Smoking status: Never   Substance and Sexual Activity    Alcohol use: Yes     Alcohol/week: 24.0 standard drinks of alcohol     Types: 24 Cans of beer per week    Sexual activity: Yes     Partners: Female     Birth control/protection: None     Social Drivers of Health     Financial Resource Strain: Patient Declined (10/18/2024)    Overall Financial Resource Strain (CARDIA)     Difficulty of Paying Living Expenses: Patient declined   Food Insecurity: Patient Declined (10/18/2024)    Hunger Vital Sign     Worried About Running Out of Food in the Last Year: Patient declined     Ran Out of Food in the Last Year: Patient declined   Transportation Needs: No Transportation Needs (9/10/2019)    Received from [facility], [facility]     "PRAPARE - Transportation     Lack of Transportation (Medical): No     Lack of Transportation (Non-Medical): No   Physical Activity: Insufficiently Active (10/18/2024)    Exercise Vital Sign     Days of Exercise per Week: 2 days     Minutes of Exercise per Session: 20 min   Stress: Stress Concern Present (10/18/2024)    [employer] - Occupational Stress Questionnaire     Feeling of Stress : To some extent   Housing Stability: Unknown (10/18/2024)    Housing Stability Vital Sign     Unable to Pay for Housing in the Last Year: Patient declined         Current Outpatient Medications:     buPROPion (WELLBUTRIN XL) 300 MG 24 hr tablet, Take 300 mg by mouth., Disp: , Rfl:     ibuprofen (ADVIL,MOTRIN) 800 MG tablet, TAKE 1 TABLET(800 MG) BY MOUTH THREE TIMES DAILY, Disp: 30 tablet, Rfl: 1    pantoprazole (PROTONIX) 40 MG tablet, Take 40 mg by mouth once daily., Disp: , Rfl:     tamsulosin (FLOMAX) 0.4 mg Cap, Take 0.4 mg by mouth once daily., Disp: , Rfl:     baclofen (LIORESAL) 10 MG tablet, Take 1 tablet (10 mg total) by mouth 3 (three) times daily. for 10 days, Disp: 30 tablet, Rfl: 0    ondansetron (ZOFRAN-ODT) 4 MG TbDL, Take 2 tablets (8 mg total) by mouth every 8 (eight) hours as needed. (Patient not taking: Reported on 10/21/2024), Disp: 30 tablet, Rfl: 0  No current facility-administered medications for this visit.    Facility-Administered Medications Ordered in Other Visits:     lactated ringers infusion, , Intravenous, Continuous, Shena Andrew MD, Last Rate: 25 mL/hr at 05/07/24 0709, New Bag at 05/07/24 0709          Objective:   Vitals:  Blood pressure 126/87, pulse 67, temperature 97.5 °F (36.4 °C), temperature source Temporal, resp. rate 16, height 5' 7" (1.702 m), weight 78.8 kg (173 lb 12.8 oz).    Physical Examination:   GEN: no apparent distress, comfortable  HEAD: atraumatic and normocephalic  EYES: no pallor, no icterus  ENT: no pharyngeal erythema, external ears WNL; " no nasal discharge  NECK: no masses, thyroid normal,  no LAD/LN's, supple  CV: RRR with no murmur; normal pulse  CHEST: Normal respiratory effort; CTAB; normal breath sounds; no wheeze or crackles  ABDOM: nontender and nondistended; soft; no rebound/guarding, L/S NP  MUSC/Skeletal: ROM normal; no crepitus; joints normal  EXTREM:  The left leg does have some hyperemia noted and 1+ edema without calf tenderness and without palpable venous cord.  The right leg is nontender without swelling and without palpable venous cord  SKIN: no rashes, lesions, ulcers, petechiae   : no CVAT, circumcised with out testicular mass L or R  NEURO: grossly intact; motor/sensory WNL;  no tremors  PSYCH: normal mood, affect and behavior  LYMPH: normal cervical, supraclavicular, axillary and groin LN's      Labs:   Lab Results   Component Value Date    WBC 8.01 10/05/2020    HGB 15.8 10/05/2020    HCT 47.8 10/05/2020    MCV 92 10/05/2020     10/05/2020    CMP  Sodium   Date Value Ref Range Status   10/05/2020 139 136 - 145 mmol/L Final     Potassium   Date Value Ref Range Status   10/05/2020 4.5 3.5 - 5.1 mmol/L Final     Chloride   Date Value Ref Range Status   10/05/2020 102 95 - 110 mmol/L Final     CO2   Date Value Ref Range Status   10/05/2020 27 23 - 29 mmol/L Final     Glucose   Date Value Ref Range Status   10/05/2020 115 (H) 70 - 110 mg/dL Final     BUN   Date Value Ref Range Status   10/05/2020 13 6 - 20 mg/dL Final     Creatinine   Date Value Ref Range Status   10/05/2020 1.0 0.5 - 1.4 mg/dL Final     Calcium   Date Value Ref Range Status   10/05/2020 9.6 8.7 - 10.5 mg/dL Final     Total Protein   Date Value Ref Range Status   10/05/2020 7.1 6.0 - 8.4 g/dL Final     Albumin   Date Value Ref Range Status   10/05/2020 4.5 3.5 - 5.2 g/dL Final     Total Bilirubin   Date Value Ref Range Status   10/05/2020 0.9 0.1 - 1.0 mg/dL Final     Comment:     For infants and newborns, interpretation of results should be based  on  gestational age, weight and in agreement with clinical  observations.  Premature Infant recommended reference ranges:  Up to 24 hours.............<8.0 mg/dL  Up to 48 hours............<12.0 mg/dL  3-5 days..................<15.0 mg/dL  6-29 days.................<15.0 mg/dL       Alkaline Phosphatase   Date Value Ref Range Status   10/05/2020 42 (L) 55 - 135 U/L Final     AST   Date Value Ref Range Status   10/05/2020 46 (H) 10 - 40 U/L Final     ALT   Date Value Ref Range Status   10/05/2020 81 (H) 10 - 44 U/L Final     Anion Gap   Date Value Ref Range Status   10/05/2020 10 8 - 16 mmol/L Final     eGFR if    Date Value Ref Range Status   10/05/2020 >60.0 >60 mL/min/1.73 m^2 Final     eGFR if non    Date Value Ref Range Status   10/05/2020 >60.0 >60 mL/min/1.73 m^2 Final     Comment:     Calculation used to obtain the estimated glomerular filtration  rate (eGFR) is the CKD-EPI equation.        LFT's increased, mildly.    Assessment:   (1) 54 y.o. male with extensive DVT involving the femoral, popliteal, anterior tibial veins of the left leg.  This acute clot event was probably precipitated by the prolonged automobile trip to and from [place], 12 hours going there in 12 hours coming home.  He has been managed with Xarelto 15 mg b.i.d., which she will take for 21 days, and then will convert over to 20 mg p.o. daily thereafter.  Clinically he appears to be improving on the Xarelto as above.  He will continue on the Xarelto for at least 4-6 months.    (2) we discussed today that for the 1st 7-10 days he should have limited activity, keeping his leg elevated as feasible, trying to reduce the risk of pulmonary embolus here.  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  As the DVT begins to dry out and crack, blood flow will pass through the affected area and  "the DVT clot should begin to erode away and resolve slowly over weeks to months.  He will continue on Xarelto 20 mg daily during this time.    (3) at 4 months out from the clot event, I plan to repeat the venous Doppler study of the left leg to check on the status of the DVT, to see if the clot is improved or resolved?  Symptoms of peripheral edema and calf tenderness and pain should gradually improve and resolve over this time.  At some point in the future, probably around 4-6 months, will plan to interrupt the Xarelto x3 days, cover him with a Lovenox bridge on day -1 and day -2.  Thereafter hypercoagulation lab evaluation will be done while he is off Xarelto and Lovenox.  After the lab is drawn, he will then resume his Xarelto 20 mg p.o. daily until we get the results of the pending lab.    (4) after the lab studies for hypercoagulation have returned, we can then make a decision on perhaps stopping the Xarelto at 6 months or continuing the Xarelto prophylaxis thereafter.    (5) he does have some mild elevation of his LFTs, he may have fatty liver, he may have alcoholic liver disease secondary to his hard liquor and beer intake.  When we check his clotting evaluation, will check the vitamin K dependent factors of 2, 7, 9, and 10.  These factors are synthesized in the liver with vitamin K.    (6) his father was a free bleeder by history" and his paternal uncle was labeled as being a hemophiliac .  It would be unlikely that he would make it to age 54 without being diagnosed as having hemophilia a or hemophilia B.  There are other coagulopathies including dysfibrinogenemia, platelet dysfunction, and von Willebrand's disease, which could eventually be diagnosed as an adult.    (7) at the point when we get him off of the Xarelto, will plan to do a clotting evaluation for liver dependent clotting proteins, hemophilia a and hemophilia B, Von Willebrand's disease, fibrinogen abnormality, and platelet " dysfunction.    (8) he has BPH by history with symptoms of nocturia and frequency of urination.  With borderline increased PSA at 3.8 to 4.1, he is seeing Urology for this condition.    (9)Improved thrombus  per current U/S, continue Xarelto 20 mg daily.  Repeat U/S 4 months and RTC 4 months.    Akbar Dempsey MD  [facility]  1/29/25

## 2025-01-29 NOTE — LETTER
February 1, 2025        Alvin Astorga IV, MD  [address]   [place]  [place] [address]             [facility] - Hematology Oncology  [address] [facility]  [address]  [place] [address]  Phone: [phone]  Fax: [phone]   Patient: Cory Musa   MR Number: [id number]   YOB: 1970   Date of Visit: 1/29/2025       Dear Dr. Astorga:    Thank you for referring Cory Musa to me for evaluation. Below are the relevant portions of my assessment and plan of care.            If you have questions, please do not hesitate to call me. I look forward to following Cory along with you.    Sincerely,      JANE Taylor MD           CC  No Recipients

## 2025-02-24 ENCOUNTER — TELEPHONE (OUTPATIENT)
Facility: CLINIC | Age: 55
End: 2025-02-24
Payer: COMMERCIAL

## 2025-03-05 ENCOUNTER — PATIENT MESSAGE (OUTPATIENT)
Facility: CLINIC | Age: 55
End: 2025-03-05
Payer: COMMERCIAL

## 2025-03-05 DIAGNOSIS — Z86.718 HISTORY OF DEEP VEIN THROMBOSIS (DVT) OF LOWER EXTREMITY: Primary | ICD-10-CM

## 2025-03-05 RX ORDER — ENOXAPARIN SODIUM 100 MG/ML
40 INJECTION SUBCUTANEOUS DAILY
Qty: 2 EACH | Refills: 0 | Status: SHIPPED | OUTPATIENT
Start: 2025-03-05

## 2025-03-17 ENCOUNTER — TELEPHONE (OUTPATIENT)
Dept: UROLOGY | Facility: CLINIC | Age: 55
End: 2025-03-17
Payer: COMMERCIAL

## 2025-03-17 NOTE — TELEPHONE ENCOUNTER
Pre appt call   Scheduled for Elevated psa   Past urological care Dr Solomon  Psa/urological hx in epic

## 2025-03-25 ENCOUNTER — OFFICE VISIT (OUTPATIENT)
Dept: UROLOGY | Facility: CLINIC | Age: 55
End: 2025-03-25
Payer: COMMERCIAL

## 2025-03-25 VITALS — HEIGHT: 67 IN | WEIGHT: 165 LBS | BODY MASS INDEX: 25.9 KG/M2

## 2025-03-25 DIAGNOSIS — N40.1 BENIGN PROSTATIC HYPERPLASIA WITH URINARY FREQUENCY: Primary | ICD-10-CM

## 2025-03-25 DIAGNOSIS — R97.20 ELEVATED PSA: ICD-10-CM

## 2025-03-25 DIAGNOSIS — R35.0 BENIGN PROSTATIC HYPERPLASIA WITH URINARY FREQUENCY: Primary | ICD-10-CM

## 2025-03-25 LAB
BILIRUBIN, UA POC OHS: NEGATIVE
BLOOD, UA POC OHS: NEGATIVE
CLARITY, UA POC OHS: CLEAR
COLOR, UA POC OHS: YELLOW
GLUCOSE, UA POC OHS: NEGATIVE
KETONES, UA POC OHS: ABNORMAL
LEUKOCYTES, UA POC OHS: NEGATIVE
NITRITE, UA POC OHS: NEGATIVE
PH, UA POC OHS: 5.5
POC RESIDUAL URINE VOLUME: 111 ML (ref 0–100)
PROTEIN, UA POC OHS: NEGATIVE
SPECIFIC GRAVITY, UA POC OHS: 1.02
UROBILINOGEN, UA POC OHS: 0.2

## 2025-03-25 PROCEDURE — 51798 US URINE CAPACITY MEASURE: CPT | Mod: S$GLB,,, | Performed by: UROLOGY

## 2025-03-25 PROCEDURE — 81003 URINALYSIS AUTO W/O SCOPE: CPT | Mod: QW,S$GLB,, | Performed by: UROLOGY

## 2025-03-25 PROCEDURE — 99999 PR PBB SHADOW E&M-EST. PATIENT-LVL III: CPT | Mod: PBBFAC,,, | Performed by: UROLOGY

## 2025-03-25 PROCEDURE — 99204 OFFICE O/P NEW MOD 45 MIN: CPT | Mod: 25,S$GLB,, | Performed by: UROLOGY

## 2025-03-25 RX ORDER — CETIRIZINE HYDROCHLORIDE 10 MG/1
10 TABLET ORAL
COMMUNITY

## 2025-03-25 RX ORDER — BENZONATATE 200 MG/1
CAPSULE ORAL
COMMUNITY

## 2025-03-25 RX ORDER — TRAMADOL HYDROCHLORIDE 50 MG/1
TABLET ORAL
COMMUNITY

## 2025-03-25 RX ORDER — ERGOCALCIFEROL 1.25 MG/1
50000 CAPSULE ORAL
COMMUNITY

## 2025-03-25 RX ORDER — RIVAROXABAN 20 MG/1
20 TABLET, FILM COATED ORAL
COMMUNITY
Start: 2025-03-15

## 2025-03-25 NOTE — PROGRESS NOTES
Former[facility], formerly known as [facility]  Group MD's:Coreen/Marco A/Melba/Beverly  Group NP's: Imani Gonzalez, Zeny Corrales    PCP: Alvin Astorga IV, MD  Date of Service: 03/25/2025  Today's note written by: MD Coreen    Subjective:        HPI: Cory Musa is a 55 y.o. male     Note by  3/6/25  54yo WM with psa 4.21 at its highest and was rechecked by Dr Astorga and noted to be 3.8. . He had a MRI that showed a 29g prostate with 3 PIRADS 4 lesions, all are anterior lesions. On flomax for BPH and doing well with it. No family history of  malignancy.     He was diagnosed with a L DVT in 10/2024 and is currently on xarelto. He sees Dr Taylor and I was finally able to get clearance to stop for a biopsy. He wants the patient to be bridged with lovenox. Follow up doppler 1/27/25 showed the clot was still in his leg.     Patient's medications, allergies, past medical, social, surgical, and family histories were reviewed and updated as appropriate.     Mri prostate 11/8/24:  Index #1: Peripheral zone- left mid gland apex 1:00 pm. Pirads 4  Index #2 & 3: Transition zone- Second index lesions anteriorly in the mid gland/apex 11-12:00 location and 1:00 location demonstrates equivocal decreased T2 signal and restricted diffusion series 4 image 24, series 700 image 26 measuring 7 mm on each side consistent with PIRADS 4. No RAKAN        Initial consult by me in clinic on 3/25/25:  History of Present Illness    CHIEF COMPLAINT:  Mr. Musa presents for evaluation of elevated PSA and follow-up on prostate lesions found on previous MRI.    HPI:  Mr. Musa is a 55-year-old male with a history of elevated PSA. His initial PSA was 4.21, noted by Dr. Adames. A repeat PSA showed a value of 3.8, with a previous high of 5.89 about 2 years ago. He underwent an MRI that revealed a 29-gram prostate with 3-4 interior lesions. He reports a history of urinary symptoms,  including split stream and poor flow, which have improved with Flomax. A scheduled prostate biopsy with Dr. Charla Ochoa was postponed due to insurance issues and a recent DVT diagnosis.    Mr. Musa had a DVT approximately 5 months ago, possibly related to a long car trip and a new job involving prolonged sitting. He is currently on Xarelto for DVT treatment. A follow-up Doppler ultrasound on January 27 showed improvement but still indicated the presence of the clot.    He reports a history of stress-related anxiety, particularly related to work. This led to an episode at the hospital where he had severe agitation and left before a previously scheduled biopsy. He was previously on anxiety medication (325 mg) but has been off it for months as his job stress has decreased.    His son and brother have a history of kidney stones. He denies any family history of prostate cancer, bladder cancer, or renal cancer.    My notes:    On flomax 0.4mg nightly  Pvr by scan: 111    PERTINENT MEDICATIONS:  Xarelto, for DVT  Flomax 0.4 mg, daily in the morning, for BPH (benign prostatic hyperplasia)  Flomax 0.4 mg, changed from daily in the morning to twice daily at night  Discontinued anxiety medication (325 mg) months ago due to job stress reduction    PERTINENT MEDICAL HISTORY:  DVT: 5 months ago  BPH  Anxiety    PERTINENT FAMILY HISTORY:  Son: Kidney stones  Brother: Kidney stones    PERTINENT TEST RESULTS:  PSA: 10/16/20, 1.95  PSA: 10/12/2023, 4.0  PSA: Date not specified (possibly June 2024), 3.8  PSA: Date not specified (most recent), 5.89 Doppler ultrasound: 1/27/2024, showed clot still present but with some improvement MRI Prostate: Date not specified, showed 29-gram prostate with 3-4 lesions, all interior    SH:SUBSTANCE USE:  Smoking: Cigars occasionally, 1-2 per week at most               Urine history: family history of kidney, bladder or prostate cancer:No, personal or family history of kidney stones: son  "with stones,tobacco use: Yes - cigars occasionally, anticoagulation: Yes - xarelto for DVT 11/2024 (sees lizzeth)  3/25/25 TR KET  10/18/24 TR KETO    PSA History: no fam hx of prostate cancer  3/25/25 [place]: small, palpated SVs, no discrete nodules  3/7/25  5.89  11/8/24  Mri prostate: 3 anterior lesions pirads 4  6/3/24  3.8, ZENIA: boggy no nodules  5/26/23 4.00 after abx  4/2/23  4.21  10/16/20 1.95    No results found for: "PSA", "PSATOTAL", "PSAFREE", "PSAFREEPCT"      REVIEW OF SYSTEMS:  Negative except for as stated above    Past Medical History:   Diagnosis Date    GERD (gastroesophageal reflux disease)     Hernia of unspecified site of abdominal cavity without mention of obstruction or gangrene     hiatal    Hiatal hernia     Insomnia     Mariscal's neuroma     Sciatica     l4-l5       Past Surgical History:   Procedure Laterality Date    EPIDURAL STEROID INJECTION Left 5/7/2024    Procedure: CERVICAL TF-BAYRON LEFT C5-C6;  Surgeon: Shena Andrew MD;  Location: [facility] OR;  Service: Pain Management;  Laterality: Left;    EPIDURAL STEROID INJECTION INTO CERVICAL SPINE Left 3/5/2024    Procedure: CERVICAL BAYRON C6-C7 INTERLAMINAR LEFT PARAMEDIAN;  Surgeon: Shena Andrew MD;  Location: [facility] OR;  Service: Pain Management;  Laterality: Left;    FOOT SURGERY      left  excision mariscal's neuroma    SURGICAL REMOVAL OF MARISCAL'S NEUROMA Left 10/6/2020    Procedure: EXCISION, MARISCAL'S NEUROMA;  Surgeon: Jagdeep Bustamante DPM;  Location: [facility] OR;  Service: Podiatry;  Laterality: Left;         Objective:     There were no vitals filed for this visit.        Assessment:     Cory Musa is a 55 y.o. male with     1. Benign prostatic hyperplasia with urinary frequency    2. Elevated PSA          Plan:     s typed/written- Abbreviated/Short Plan:  Psa free and total tomorrow   Psa free and total 3 months and fu after  Currently on xarelto. Cleared to take lovenox for biopsy however in January Still had clot on last " ultrasound, Sees  next month. Would prefer clot to be resolved. If resolved by time he sees him can proceed with biopsy   Will plan for prostate biopsy after we see him again in 3 months. May need repeat mri  In the meantime will try to get images pushed over from [facility] and confirm we can uses images for uronav  Increase flomax to 2 a night (pvr 111 today)  Rtc in 3 months with psa free and total prior for aua ssx and pvr    An [facility] radiologist has to nirav the MRI then submit thru PACS to [facility]. We then pull it up from [facility] and download to our local [facility] machine. Very complicated process.       The following assessment plan was created by [employer] via ambient listening:  Assessment & Plan    N40.1, R35.0 Benign prostatic hyperplasia with urinary frequency  R97.20 Elevated PSA    IMPRESSION:   Elevated PSA with MRI showing 3-4 interior lesions.   Recent DVT and Xarelto use complicating biopsy planning.   PSA trend: 1.95 four years ago, doubled to ~4 two years later, most recently 5.89   Post-void residual measured at 111 mL, not critically high but warrants monitoring.   Delayed biopsy due to current DVT and anticoagulation risks. Explained although likely prostate cancer present likely indolent and would likely delay treatment if found to be intermediate risk.    Reassess after clot resolution and repeat PSA in 3 months.   May need to repeat prostate MRI if current images cannot be used.    Please review the short plan as above for concise and accurate plan. The dictated/AI generated plan may have some inaccuracies .    PLAN SUMMARY:   Increase Flomax to 2 tablets at night   Obtain MRI images from [facility] for review   Order PSA free and total test for Thursday   Contact office if Dr. Dempsey confirms DVT resolution   Follow-up in 3 months for AUA symptom score and bladder scan   Follow-up in 3 months for PSA free and total   Perform all future PSA tests at current clinic for  consistency    BENIGN PROSTATIC HYPERPLASIA WITH URINARY FREQUENCY:   Increased Flomax to 2 tablets at night.   Follow up in 3 months for AUA symptom score and bladder scan.    ELEVATED PSA:   Mr. Musa to obtain MRI images from [facility] for review.   Mr. Musa to perform all future PSA tests at the current clinic for consistency.   Ordered PSA free and total to be done on Thursday.   Follow up in 3 months for PSA free and total.   Contact office if Dr. Dempsey confirms DVT resolution, to potentially move up biopsy timeline.   Send message through patient portal if US is clear and want to proceed sooner.             Portions of this note was generated with the assistance of ambient listening technology. Verbal consent was obtained by the patient and accompanying visitor(s) for the recording of patient appointment to facilitate this note. I attest to having reviewed and edited the generated note for accuracy, though some syntax or spelling errors may persist. Please contact the author of this note for any clarification.                Sherry Angela MD

## 2025-03-25 NOTE — PATIENT INSTRUCTIONS
's typed/written- Abbreviated/Short Plan:  Psa free and total tomorrow   Psa free and total 3 months and fu after  Currently on xarelto. Cleared to take lovenox for biopsy however in January Still had clot on last ultrasound, Sees  next month. Would prefer clot to be resolved. If resolved by time he sees him can proceed with biopsy   Will plan for prostate biopsy after we see him again in 3 months. May need repeat mri  In the meantime will try to get images pushed over from [facility] and confirm we can uses images for uronav  Increase flomax to 2 a night (pvr 111 today)  Rtc in 3 months with psa free and total prior for aua ssx and pvr    An [facility] radiologist has to nirav the MRI then submit thru PACS to [employer]. We then pull it up from [employer] and download to our local [facility] machine. Very complicated process.       The following assessment plan was created by [employer] via ambient listening:  Assessment & Plan    N40.1, R35.0 Benign prostatic hyperplasia with urinary frequency  R97.20 Elevated PSA    IMPRESSION:   Elevated PSA with MRI showing 3-4 interior lesions.   Recent DVT and Xarelto use complicating biopsy planning.   PSA trend: 1.95 four years ago, doubled to ~4 two years later, most recently 5.89.   Rectal exam: prostate feels boggy, no nodules palpated.   Post-void residual measured at 111 mL, not critically high but warrants monitoring.   Delayed biopsy due to current DVT and anticoagulation risks.   Reassess after clot resolution and repeat PSA in 3 months.   May need to repeat prostate MRI if current images cannot be used.    Please review the short plan as above for concise and accurate plan. The dictated/AI generated plan may have some inaccuracies .    PLAN SUMMARY:   Increase Flomax to 2 tablets at night   Obtain MRI images from [place] for review   Order PSA free and total test for Thursday   Contact office if Dr. Dempsey confirms DVT resolution   Follow-up in  3 months for AUA symptom score and bladder scan   Follow-up in 3 months for PSA free and total   Perform all future PSA tests at current clinic for consistency    BENIGN PROSTATIC HYPERPLASIA WITH URINARY FREQUENCY:   Increased Flomax to 2 tablets at night.   Follow up in 3 months for AUA symptom score and bladder scan.    ELEVATED PSA:   Mr. Musa to obtain MRI images from [place] for review.   Mr. Musa to perform all future PSA tests at the current clinic for consistency.   Ordered PSA free and total to be done on Thursday.   Follow up in 3 months for PSA free and total.   Contact office if Dr. Dempsey confirms DVT resolution, to potentially move up biopsy timeline.   Send message through patient portal if US is clear and want to proceed sooner.             Portions of this note was generated with the assistance of ambient listening technology. Verbal consent was obtained by the patient and accompanying visitor(s) for the recording of patient appointment to facilitate this note. I attest to having reviewed and edited the generated note for accuracy, though some syntax or spelling errors may persist. Please contact the author of this note for any clarification.

## 2025-03-26 ENCOUNTER — RESULTS FOLLOW-UP (OUTPATIENT)
Dept: UROLOGY | Facility: CLINIC | Age: 55
End: 2025-03-26

## 2025-03-26 ENCOUNTER — LAB VISIT (OUTPATIENT)
Dept: LAB | Facility: HOSPITAL | Age: 55
End: 2025-03-26
Attending: UROLOGY
Payer: COMMERCIAL

## 2025-03-26 DIAGNOSIS — R97.20 ELEVATED PSA: ICD-10-CM

## 2025-03-26 LAB
PSA FREE MFR SERPL: 9.13 %
PSA FREE SERPL-MCNC: 0.43 NG/ML
PSA SERPL-MCNC: 4.71 NG/ML (ref ?–4)

## 2025-03-26 PROCEDURE — 84154 ASSAY OF PSA FREE: CPT

## 2025-03-26 PROCEDURE — 36415 COLL VENOUS BLD VENIPUNCTURE: CPT

## 2025-04-28 ENCOUNTER — HOSPITAL ENCOUNTER (OUTPATIENT)
Dept: RADIOLOGY | Facility: HOSPITAL | Age: 55
Discharge: HOME | End: 2025-04-28
Attending: INTERNAL MEDICINE
Payer: COMMERCIAL

## 2025-04-28 DIAGNOSIS — I82.412 ACUTE DEEP VEIN THROMBOSIS (DVT) OF FEMORAL VEIN OF LEFT LOWER EXTREMITY: ICD-10-CM

## 2025-04-28 PROCEDURE — 93971 EXTREMITY STUDY: CPT | Mod: 26,LT,, | Performed by: RADIOLOGY

## 2025-04-28 PROCEDURE — 93971 EXTREMITY STUDY: CPT | Mod: TC,PO,LT

## 2025-05-13 ENCOUNTER — OFFICE VISIT (OUTPATIENT)
Facility: CLINIC | Age: 55
End: 2025-05-13
Payer: COMMERCIAL

## 2025-05-13 VITALS
RESPIRATION RATE: 16 BRPM | SYSTOLIC BLOOD PRESSURE: 122 MMHG | OXYGEN SATURATION: 97 % | HEIGHT: 67 IN | WEIGHT: 173 LBS | BODY MASS INDEX: 27.15 KG/M2 | TEMPERATURE: 98 F | HEART RATE: 72 BPM | DIASTOLIC BLOOD PRESSURE: 87 MMHG

## 2025-05-13 DIAGNOSIS — N40.1 BENIGN PROSTATIC HYPERPLASIA WITH URINARY FREQUENCY: ICD-10-CM

## 2025-05-13 DIAGNOSIS — I82.512 CHRONIC DEEP VEIN THROMBOSIS (DVT) OF FEMORAL VEIN OF LEFT LOWER EXTREMITY: Primary | ICD-10-CM

## 2025-05-13 DIAGNOSIS — R35.0 BENIGN PROSTATIC HYPERPLASIA WITH URINARY FREQUENCY: ICD-10-CM

## 2025-05-13 PROCEDURE — 99215 OFFICE O/P EST HI 40 MIN: CPT | Mod: S$GLB,,, | Performed by: INTERNAL MEDICINE

## 2025-05-13 PROCEDURE — 99999 PR PBB SHADOW E&M-EST. PATIENT-LVL V: CPT | Mod: PBBFAC,,, | Performed by: INTERNAL MEDICINE

## 2025-05-13 PROCEDURE — G2211 COMPLEX E/M VISIT ADD ON: HCPCS | Mod: S$GLB,,, | Performed by: INTERNAL MEDICINE

## 2025-06-27 ENCOUNTER — LAB VISIT (OUTPATIENT)
Dept: LAB | Facility: HOSPITAL | Age: 55
End: 2025-06-27
Attending: UROLOGY
Payer: COMMERCIAL

## 2025-06-27 DIAGNOSIS — N40.1 BENIGN PROSTATIC HYPERPLASIA WITH URINARY FREQUENCY: ICD-10-CM

## 2025-06-27 DIAGNOSIS — R97.20 ELEVATED PSA: ICD-10-CM

## 2025-06-27 DIAGNOSIS — R35.0 BENIGN PROSTATIC HYPERPLASIA WITH URINARY FREQUENCY: ICD-10-CM

## 2025-06-27 LAB
PSA FREE MFR SERPL: 10.11 %
PSA FREE SERPL-MCNC: 0.38 NG/ML
PSA SERPL-MCNC: 3.76 NG/ML (ref ?–4)

## 2025-06-27 PROCEDURE — 36415 COLL VENOUS BLD VENIPUNCTURE: CPT

## 2025-06-27 PROCEDURE — 84154 ASSAY OF PSA FREE: CPT

## 2025-07-07 ENCOUNTER — OFFICE VISIT (OUTPATIENT)
Dept: UROLOGY | Facility: CLINIC | Age: 55
End: 2025-07-07
Payer: COMMERCIAL

## 2025-07-07 ENCOUNTER — HOSPITAL ENCOUNTER (OUTPATIENT)
Dept: RADIOLOGY | Facility: HOSPITAL | Age: 55
Discharge: HOME | End: 2025-07-07
Attending: INTERNAL MEDICINE
Payer: COMMERCIAL

## 2025-07-07 DIAGNOSIS — R97.20 ELEVATED PSA: ICD-10-CM

## 2025-07-07 DIAGNOSIS — I82.512 CHRONIC DEEP VEIN THROMBOSIS (DVT) OF FEMORAL VEIN OF LEFT LOWER EXTREMITY: ICD-10-CM

## 2025-07-07 DIAGNOSIS — N40.1 BENIGN PROSTATIC HYPERPLASIA WITH URINARY FREQUENCY: Primary | ICD-10-CM

## 2025-07-07 DIAGNOSIS — R35.0 BENIGN PROSTATIC HYPERPLASIA WITH URINARY FREQUENCY: Primary | ICD-10-CM

## 2025-07-07 LAB
BILIRUBIN, UA POC OHS: NEGATIVE
BLOOD, UA POC OHS: NEGATIVE
CLARITY, UA POC OHS: CLEAR
COLOR, UA POC OHS: YELLOW
GLUCOSE, UA POC OHS: NEGATIVE
KETONES, UA POC OHS: NEGATIVE
LEUKOCYTES, UA POC OHS: NEGATIVE
NITRITE, UA POC OHS: NEGATIVE
PH, UA POC OHS: 6
PROTEIN, UA POC OHS: NEGATIVE
SPECIFIC GRAVITY, UA POC OHS: 1.01
UROBILINOGEN, UA POC OHS: 0.2

## 2025-07-07 PROCEDURE — 93971 EXTREMITY STUDY: CPT | Mod: TC,PO,LT

## 2025-07-07 PROCEDURE — 99999 PR PBB SHADOW E&M-EST. PATIENT-LVL III: CPT | Mod: PBBFAC,,, | Performed by: UROLOGY

## 2025-07-07 PROCEDURE — 99214 OFFICE O/P EST MOD 30 MIN: CPT | Mod: 25,S$GLB,, | Performed by: UROLOGY

## 2025-07-07 PROCEDURE — 81003 URINALYSIS AUTO W/O SCOPE: CPT | Mod: QW,S$GLB,, | Performed by: UROLOGY

## 2025-07-07 PROCEDURE — 93971 EXTREMITY STUDY: CPT | Mod: 26,LT,, | Performed by: RADIOLOGY

## 2025-07-07 NOTE — PROGRESS NOTES
Formerly [facility], formerly known as [facility]  Group MD's:Coreen/Marco A/Melba/Beverly  Group NP's: Zeny Chowdhury    PCP: Alvin Astorga IV, MD  Date of Service: 07/07/2025  Today's note written by: MD Coreen    Subjective:        HPI: Cory Musa is a 55 y.o. male     Note by  3/6/25  56yo WM with psa 4.21 at its highest and was rechecked by Dr Astorga and noted to be 3.8. . He had a MRI that showed a 29g prostate with 3 PIRADS 4 lesions, all are anterior lesions. On flomax for BPH and doing well with it. No family history of  malignancy.     He was diagnosed with a L DVT in 10/2024 and is currently on xarelto. He sees Dr Taylor and I was finally able to get clearance to stop for a biopsy. He wants the patient to be bridged with lovenox. Follow up doppler 1/27/25 showed the clot was still in his leg.     Patient's medications, allergies, past medical, social, surgical, and family histories were reviewed and updated as appropriate.     Mri prostate 11/8/24:  Index #1: Peripheral zone- left mid gland apex 1:00 pm. Pirads 4  Index #2 & 3: Transition zone- Second index lesions anteriorly in the mid gland/apex 11-12:00 location and 1:00 location demonstrates equivocal decreased T2 signal and restricted diffusion series 4 image 24, series 700 image 26 measuring 7 mm on each side consistent with PIRADS 4. No RAKAN        Initial consult by me in clinic on 3/25/25:  Mr. Musa is a 55-year-old male with a history of elevated PSA. His initial PSA was 4.21, noted by Dr. Adames. A repeat PSA showed a value of 3.8, with a previous high of 5.89 about 2 years ago. He underwent an MRI that revealed a 29-gram prostate with 3-4 interior lesions. He reports a history of urinary symptoms, including split stream and poor flow, which have improved with Flomax. A scheduled prostate biopsy with Dr. Charla Ochoa was postponed due to insurance issues and a  recent DVT diagnosis.  Mr. Musa had a DVT approximately 5 months ago, possibly related to a long car trip and a new job involving prolonged sitting. He is currently on Xarelto for DVT treatment. A follow-up Doppler ultrasound on January 27 showed improvement but still indicated the presence of the clot.  He reports a history of stress-related anxiety, particularly related to work. This led to an episode at the hospital where he had severe agitation and left before a previously scheduled biopsy. He was previously on anxiety medication (325 mg) but has been off it for months as his job stress has decreased.  His son and brother have a history of kidney stones. He denies any family history of prostate cancer, bladder cancer, or renal cancer.    My notes:  On flomax 0.4mg nightly  Pvr by scan: 111    PERTINENT MEDICATIONS:  Xarelto, for DVT  Flomax 0.4 mg, daily in the morning, for BPH (benign prostatic hyperplasia)  Flomax 0.4 mg, changed from daily in the morning to twice daily at night  Discontinued anxiety medication (325 mg) months ago due to job stress reduction        Interval history by ME/ in CLINIC (In-person) on 7/7/25:  History of Present Illness    CHIEF COMPLAINT:  Mr. Musa presents for a follow-up visit to discuss PSA levels and urinary symptoms.    HPI:  Mr. Musa's PSA levels have been fluctuating. It previously increased to around 6, but has since decreased to 3.76, which is lower than the level of 4.21 from almost 2 years ago. Mr. Musa was previously having split stream urination and other urinary symptoms, leading to Flomax prescription. He has been taking Flomax 0.4 mg in the mornings, but forgot to take it today. Mr. Musa reports no current problems with urination or side effects from Flomax.    Mr. Musa has a partial blood clot behind his knee, which has improved but is still present. He recently had an ultrasound to assess this clot. He has been off work for a  while, hoping to improve the blood clot situation. Mr. Musa is currently on Xarelto (Serotal) indefinitely due to the blood clot.    An MRI of the prostate showed a PIRADS 4 finding, which was described as concerning. A rectal exam was performed in March, and the doctor did not feel anything concerning.    Mr. Musa denies being diabetic.    PERTINENT MEDICATIONS:  Xarelto, for blood clot  Flomax 0.4 mg, nightly, for urinary symptoms  Discontinued Baclofen  Discontinued Lovenox, was prescribed but never taken    PERTINENT MEDICAL HISTORY:  Blood clot: Partial clot behind knee    PERTINENT TEST RESULTS:  PSA: June 2023, 4.21  PSA: 6 (approximate)  PSA: 3.76  PSA: 3.6 (approximate)  Urinalysis: Today, negative  Blood sugar: Last visit, 111  Blood sugar: Today, 82  PSA percent free: 10.1% bladder scan (Post-Void Residual): Last visit, 111 mL (after urination)  Symptom score: Today, 5-6 (very low score) Prostate MRI: PIRADS 4 finding  Ultrasound (likely leg): Recent (today), persistent partial occlusive thrombus, similar in appearance to April 28th       My notes:  Psa was down to 4.71 and most recently 3.76  Ua today neg   Last visit pvr was 111, felt empty. Asked him to increase to 0.8mg nighlty. However he had no urinary complaints on just 1 (was put on  it for split stream, slow flow). Still taking 1 flomax and No problems urinating Pvr by scan: 82   AUA ssx:(0 incomplete emptying, 1 frequency, 0 intermittency, 3 urgency, 0 weak stream, 0 straining, 2 sleeping). 6. QOL: mostly satisfied    3/26/25 4.71, %9.13  6/27/25 3.76, %free 10.11          Urine history: family history of kidney, bladder or prostate cancer:No, personal or family history of kidney stones: son with stones,tobacco use: Yes - cigars occasionally, anticoagulation: Yes - xarelto for DVT 11/2024 (seeasmita moreau)  3/25/25 TR KET  10/18/24 TR KETO    PSA History: no fam hx of prostate cancer  6/27/25 3.76, %free 10.11  3/26/25 4.71,  %9.13  3/25/25 Zenia: small, palpated SVs, no discrete nodules  3/7/25  5.89  11/8/24  Mri prostate: 3 anterior lesions pirads 4. 15g.   6/3/24  3.8, ZENIA: boggy no nodules  5/26/23 4.00 after abx  4/2/23  4.21  10/16/20 1.95            REVIEW OF SYSTEMS:  Negative except for as stated above        Objective:     There were no vitals filed for this visit.        Assessment:     Cory Musa is a 55 y.o. male with     1. Benign prostatic hyperplasia with urinary frequency    2. Elevated PSA          Plan:     's typed/written- Abbreviated/Short Plan:  Continue flomax 0.4mg nightly (for incomplet emptying, prostate slow)  Psa down to 3.76, lowest it's been in a while. Change psa to 1 year form now (June 2026)  Fu in a year with psa free and total prior for auassx and pvr       The following assessment plan was created by [employer] via ambient listening:  Assessment & Plan    N40.1, R35.0 Benign prostatic hyperplasia with urinary frequency  R97.20 Elevated PSA    IMPRESSION:   PSA have fluctuated but are currently lower than 2 years ago (3.76, down from 4.21).   Recent rectal exam in March revealed no concerning findings.   MRI showed PIRADS 4 lesion, indicating 46% chance of [place] 7 or higher prostate cancer.   Given stable/decreasing PSA and use of Xarelto, decided to continue monitoring rather than pursue immediate biopsy.   Percent free PSA is low, suggesting increased cancer risk, but stable PSA trends support watchful waiting approach.   Deferred SelectMDX test for further risk stratification given current PSA trend.   Bladder not emptying completely, but no clear explanation identified.   Blood sugar 82 today, down from 111 previously.    Please review the short plan as above for concise and accurate plan. The dictated/AI generated plan may have some inaccuracies .    PLAN SUMMARY:   Order PSA (free and total) in 1 year (June 2025)   Order urinalysis, symptom score, and post-void residual (bladder  scan)   Continue Flomax 0.4 mg nightly   Continue increased ambulation to help improve blood clot   Follow up in 1 year for PSA, urinalysis, symptom score, bladder scan, and rectal exam    BENIGN PROSTATIC HYPERPLASIA WITH URINARY FREQUENCY:   Ordered urinalysis, symptom score, and post-void residual (bladder scan) Continue Flomax 0.4 mg nightly Follow up in 1 year for urinalysis, symptom score, bladder scan, and rectal exam    ELEVATED PSA:   Explained significance of percent free PSA in cancer risk assessment Discussed PIRADS scoring system and its implications for prostate cancer risk Provided information on SelectMDX test as a potential future diagnostic tool Ordered PSA (free and total) in 1 year (June 2025) Mr. Musa to continue with increased ambulation to help improve blood clot Follow up in 1 year for PSA (free and total)             This note was generated with the assistance of ambient listening technology. Verbal consent was obtained by the patient and accompanying visitor(s) for the recording of patient appointment to facilitate this note. I attest to having reviewed and edited the generated note for accuracy, though some syntax or spelling errors may persist. Please contact the author of this note for any clarification.                            Sherry Angela MD

## 2025-08-05 ENCOUNTER — OFFICE VISIT (OUTPATIENT)
Facility: CLINIC | Age: 55
End: 2025-08-05
Payer: COMMERCIAL

## 2025-08-05 VITALS
RESPIRATION RATE: 18 BRPM | HEIGHT: 67 IN | WEIGHT: 178.13 LBS | BODY MASS INDEX: 27.96 KG/M2 | TEMPERATURE: 98 F | DIASTOLIC BLOOD PRESSURE: 85 MMHG | HEART RATE: 70 BPM | SYSTOLIC BLOOD PRESSURE: 132 MMHG | OXYGEN SATURATION: 98 %

## 2025-08-05 DIAGNOSIS — D68.59 HYPERCOAGULATION SYNDROME: Primary | ICD-10-CM

## 2025-08-05 DIAGNOSIS — I82.502 LEG DVT (DEEP VENOUS THROMBOEMBOLISM), CHRONIC, LEFT: ICD-10-CM

## 2025-08-05 PROCEDURE — 99999 PR PBB SHADOW E&M-EST. PATIENT-LVL IV: CPT | Mod: PBBFAC,,, | Performed by: INTERNAL MEDICINE

## 2025-08-05 NOTE — PROGRESS NOTES
[facility] [address] Hematology Oncology In Office Subsequent Encounter Note    8/5/25    Subjective:      Patient ID:   Cory Musa  55 y.o. male  1970  Dr. Astorga,       Chief Complaint:   L leg DVT    HPI: U/S now shows no further clearing of L leg DVT, he continues on Xarelto.  Discussed hold Xarelto W and Th, check hypercoag Friday, HH.  Resume Xarelto while we wait on results.    He does have history of prostatism symptoms and is on Flomax, symptoms are improved his PSA is 4.71. Dr Angela.    55 y.o. male gently traveled by [employer] to [place], 12 hours there and 12 hours coming back home.  He reported gradually increasing pain in the left calf over several weeks, leading to increased pain and swelling in the calf and his going to the emergency room October 15, 2024.  Ultrasound of the left leg showed femoral vein, popliteal vein, anterior tibial vein DVT.  He was managed with Xarelto 15 mg b.i.d. times 21 days and will convert over to 20 mg daily thereafter.  Symptoms at the left leg continued without improvement and he was seen in the emergency room on Friday October 18th, repeat ultrasound at that time was essentially unchanged after being on the Xarelto for 3-4 days.  He tells me now that he gets intermittent swelling in the leg on standing for some period of time, he also has some improving calf tenderness.  He denies chest pain or shortness of breath or hemoptysis symptoms during his recent DVT event dating back to October 15, 2024.    He does have a history of right leg tendon being torn several times, he feels that when this happens with symptoms, that it places stress on the left leg .  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  X-rays of the left foot were negative.  He does have history of gout symptoms primarily in the big toe, for which she takes colchicine.    He carries a diagnosis of BPH, his PSA runs 3.8-4.1.  He has seen the the  urologist for symptoms of nocturia and frequency of urination.    He has had 2 ft surgeries for the Woodson's neuroma at the left foot, status post vasectomy x1, he recently had a skin cancer removed from the scalp per Dr. Conteh of Dermatology approximately 2 weeks ago.  He told me that he did have prolonged bleeding symptoms at the time of the surgery to remove the skin cancer on his scalp.  He was not on Xarelto at that time.    He smokes a cigar weekly with his son, he drinks 2 cocktails at night and beer on weekends.  He gives a history of allergy to Mobic with rash and Keflex with rash.  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    His dad was a free bleeder .  His paternal uncle carried a diagnosis of hemophilia, requiring transfusion with blood products and eventually  of hepatitis complications.    His maternal grandmother was a smoker and had multiple CVAs.  His maternal grandfather was a smoker and had myocardial infarction.  His mother had decreased memory, miscarriage history x1, and melanoma at the ear.  His father  at 74 and was a smoker who had obesity, COPD and myocardial infarction.  His brother is obese at 400 lb and has history of coronary artery disease and myocardial infarction and staph infection at the leg.  He has a son alive and well.      ROS:   GEN: normal without any fever, night sweats or weight loss  HEENT: See HPI  CV: normal with no CP, SOB, PND, MENEZES or orthopnea  PULM: normal with no SOB, cough, hemoptysis, sputum or pleuritic pain  GI: normal with no abdominal pain, nausea, vomiting, constipation, diarrhea, melanotic stools, BRBPR, or hematemesis  : normal with no hematuria, dysuria  BREAST: normal with no mass, discharge, pain  SKIN: normal with no rash, erythema, bruising, or swelling     Past Medical History:   Diagnosis Date    GERD (gastroesophageal reflux disease)     Hernia of unspecified  site of abdominal cavity without mention of obstruction or gangrene     hiatal    Hiatal hernia     Insomnia     Mariscal's neuroma     Sciatica     l4-l5     Past Surgical History:   Procedure Laterality Date    EPIDURAL STEROID INJECTION Left 5/7/2024    Procedure: CERVICAL TF-BAYRON LEFT C5-C6;  Surgeon: Shena Andrew MD;  Location: [facility];  Service: Pain Management;  Laterality: Left;    EPIDURAL STEROID INJECTION INTO CERVICAL SPINE Left 3/5/2024    Procedure: CERVICAL BAYRON C6-C7 INTERLAMINAR LEFT PARAMEDIAN;  Surgeon: Shena Andrew MD;  Location: [facility];  Service: Pain Management;  Laterality: Left;    FOOT SURGERY      left  excision mariscal's neuroma    SURGICAL REMOVAL OF MARISCAL'S NEUROMA Left 10/6/2020    Procedure: EXCISION, MARISCAL'S NEUROMA;  Surgeon: Jagdeep Bustamante DPM;  Location: [facility];  Service: Podiatry;  Laterality: Left;       Review of patient's allergies indicates:   Allergen Reactions    Mobic [meloxicam]      Rash and itching    Keflex [cephalexin] Rash     Social History     Socioeconomic History    Marital status:    Occupational History    Occupation: [employer]     Employer: [employer]   Tobacco Use    Smoking status: Never   Substance and Sexual Activity    Alcohol use: Yes     Alcohol/week: 24.0 standard drinks of alcohol     Types: 24 Cans of beer per week    Sexual activity: Yes     Partners: Female     Birth control/protection: None     Social Drivers of Health     Financial Resource Strain: Medium Risk (3/17/2025)    Overall Financial Resource Strain (CARDIA)     Difficulty of Paying Living Expenses: Somewhat hard   Food Insecurity: No Food Insecurity (3/17/2025)    Hunger Vital Sign     Worried About Running Out of Food in the Last Year: Never true     Ran Out of Food in the Last Year: Never true   Transportation Needs: No Transportation Needs (3/17/2025)    PRAPARE - Transportation     Lack of Transportation (Medical): No     Lack of Transportation (Non-Medical): No  "  Physical Activity: Insufficiently Active (3/17/2025)    Exercise Vital Sign     Days of Exercise per Week: 2 days     Minutes of Exercise per Session: 10 min   Stress: Stress Concern Present (3/17/2025)    [employer] [place] [employer] - Occupational Stress Questionnaire     Feeling of Stress : To some extent   Housing Stability: Low Risk  (3/17/2025)    Housing Stability Vital Sign     Unable to Pay for Housing in the Last Year: No     Number of Times Moved in the Last Year: 0     Homeless in the Last Year: No         Current Outpatient Medications:     cetirizine (ZYRTEC) 10 MG tablet, Take 10 mg by mouth., Disp: , Rfl:     ibuprofen (ADVIL,MOTRIN) 800 MG tablet, TAKE 1 TABLET(800 MG) BY MOUTH THREE TIMES DAILY, Disp: 30 tablet, Rfl: 1    pantoprazole (PROTONIX) 40 MG tablet, Take 40 mg by mouth once daily., Disp: , Rfl:     tamsulosin (FLOMAX) 0.4 mg Cap, Take 0.4 mg by mouth once daily., Disp: , Rfl:     ergocalciferol (ERGOCALCIFEROL) 50,000 unit Cap, Take 50,000 Units by mouth every 7 days., Disp: , Rfl:     traMADoL (ULTRAM) 50 mg tablet, Take 1 tablet every 6 hours by oral route as needed., Disp: , Rfl:     XARELTO 20 mg Tab, TAKE 1 TABLET DAILY WITH   DINNER OR EVENING MEAL, Disp: 90 tablet, Rfl: 2  No current facility-administered medications for this visit.    Facility-Administered Medications Ordered in Other Visits:     lactated ringers infusion, , Intravenous, Continuous, Shena Andrew MD, Last Rate: 25 mL/hr at 05/07/24 0709, New Bag at 05/07/24 0709          Objective:   Vitals:  Blood pressure 132/85, pulse 70, temperature 98.1 °F (36.7 °C), temperature source Temporal, resp. rate 18, height 5' 7" (1.702 m), weight 80.8 kg (178 lb 1.6 oz), SpO2 98%.    Physical Examination:   GEN: no apparent distress, comfortable  HEAD: atraumatic and normocephalic  EYES: no pallor, no icterus  ENT: no pharyngeal erythema, external ears WNL; no nasal discharge  NECK: no masses, thyroid normal,  no " LAD/LN's, supple  CV: RRR with no murmur; normal pulse  CHEST: Normal respiratory effort; CTAB; normal breath sounds; no wheeze or crackles  ABDOM: nontender and nondistended; soft; no rebound/guarding, L/S NP  MUSC/Skeletal: ROM normal; no crepitus; joints normal  EXTREM:  The left leg does have some hyperemia noted and 1+ edema without calf tenderness and without palpable venous cord.  The right leg is nontender without swelling and without palpable venous cord  SKIN: no rashes, lesions, ulcers, petechiae   : no CVAT, circumcised with out testicular mass L or R  NEURO: grossly intact; motor/sensory WNL;  no tremors  PSYCH: normal mood, affect and behavior  LYMPH: normal cervical, supraclavicular, axillary and groin LN's      Labs:   Lab Results   Component Value Date    WBC 8.01 10/05/2020    HGB 15.8 10/05/2020    HCT 47.8 10/05/2020    MCV 92 10/05/2020     10/05/2020    CMP  Sodium   Date Value Ref Range Status   10/05/2020 139 136 - 145 mmol/L Final     Potassium   Date Value Ref Range Status   10/05/2020 4.5 3.5 - 5.1 mmol/L Final     Chloride   Date Value Ref Range Status   10/05/2020 102 95 - 110 mmol/L Final     CO2   Date Value Ref Range Status   10/05/2020 27 23 - 29 mmol/L Final     Glucose   Date Value Ref Range Status   10/05/2020 115 (H) 70 - 110 mg/dL Final     BUN   Date Value Ref Range Status   10/05/2020 13 6 - 20 mg/dL Final     Creatinine   Date Value Ref Range Status   10/05/2020 1.0 0.5 - 1.4 mg/dL Final     Calcium   Date Value Ref Range Status   10/05/2020 9.6 8.7 - 10.5 mg/dL Final     Total Protein   Date Value Ref Range Status   10/05/2020 7.1 6.0 - 8.4 g/dL Final     Albumin   Date Value Ref Range Status   10/05/2020 4.5 3.5 - 5.2 g/dL Final     Total Bilirubin   Date Value Ref Range Status   10/05/2020 0.9 0.1 - 1.0 mg/dL Final     Comment:     For infants and newborns, interpretation of results should be based  on gestational age, weight and in agreement with  clinical  observations.  Premature Infant recommended reference ranges:  Up to 24 hours.............<8.0 mg/dL  Up to 48 hours............<12.0 mg/dL  3-5 days..................<15.0 mg/dL  6-29 days.................<15.0 mg/dL       Alkaline Phosphatase   Date Value Ref Range Status   10/05/2020 42 (L) 55 - 135 U/L Final     AST   Date Value Ref Range Status   10/05/2020 46 (H) 10 - 40 U/L Final     ALT   Date Value Ref Range Status   10/05/2020 81 (H) 10 - 44 U/L Final     Anion Gap   Date Value Ref Range Status   10/05/2020 10 8 - 16 mmol/L Final     eGFR if    Date Value Ref Range Status   10/05/2020 >60.0 >60 mL/min/1.73 m^2 Final     eGFR if non    Date Value Ref Range Status   10/05/2020 >60.0 >60 mL/min/1.73 m^2 Final     Comment:     Calculation used to obtain the estimated glomerular filtration  rate (eGFR) is the CKD-EPI equation.        LFT's increased, mildly.    Assessment:   (1) 55 y.o. male with extensive DVT involving the femoral, popliteal, anterior tibial veins of the left leg.  This acute clot event was probably precipitated by the prolonged automobile trip to and from [place], 12 hours going there in 12 hours coming home.  He has been managed with Xarelto 15 mg b.i.d., which she will take for 21 days, and then will convert over to 20 mg p.o. daily thereafter.  Clinically he appears to be improving on the Xarelto as above.  He will continue on the Xarelto for at least 4-6 months.    (2) we discussed today that for the 1st 7-10 days he should have limited activity, keeping his leg elevated as feasible, trying to reduce the risk of pulmonary embolus here.  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  As the DVT begins to dry out and crack, blood flow will pass through the affected area and the DVT clot should begin to erode away and  "resolve slowly over weeks to months.  He will continue on Xarelto 20 mg daily during this time.    (3) at 4 months out from the clot event, I plan to repeat the venous Doppler study of the left leg to check on the status of the DVT, to see if the clot is improved or resolved?  Symptoms of peripheral edema and calf tenderness and pain should gradually improve and resolve over this time.  At some point in the future, probably around 4-6 months, will plan to interrupt the Xarelto x3 days, cover him with a Lovenox bridge on day -1 and day -2.  Thereafter hypercoagulation lab evaluation will be done while he is off Xarelto and Lovenox.  After the lab is drawn, he will then resume his Xarelto 20 mg p.o. daily until we get the results of the pending lab.    (4) after the lab studies for hypercoagulation have returned, we can then make a decision on perhaps stopping the Xarelto at 6 months or continuing the Xarelto prophylaxis thereafter.    (5) he does have some mild elevation of his LFTs, he may have fatty liver, he may have alcoholic liver disease secondary to his hard liquor and beer intake.  When we check his clotting evaluation, will check the vitamin K dependent factors of 2, 7, 9, and 10.  These factors are synthesized in the liver with vitamin K.    (6) his father was a free bleeder by history" and his paternal uncle was labeled as being a hemophiliac .  It would be unlikely that he would make it to age 54 without being diagnosed as having hemophilia a or hemophilia B.  There are other coagulopathies including dysfibrinogenemia, platelet dysfunction, and von Willebrand's disease, which could eventually be diagnosed as an adult.    (7) at the point when we get him off of the Xarelto, will plan to do a clotting evaluation for liver dependent clotting proteins, hemophilia a and hemophilia B, Von Willebrand's disease, fibrinogen abnormality, and platelet dysfunction.    (8) he has BPH by history with symptoms of " nocturia and frequency of urination.  With borderline increased PSA at 3.8 to 4.1, he is seeing Urology for this condition.    (9)Stable  thrombus  per current U/S, continue Xarelto 20 mg daily.  Check hypercoag eval as per HPI.  RTC 1 month.    Akbar Dempsey MD  [facility]  8/5/25

## 2025-08-07 DIAGNOSIS — I82.512 CHRONIC DEEP VEIN THROMBOSIS (DVT) OF FEMORAL VEIN OF LEFT LOWER EXTREMITY: Primary | ICD-10-CM

## 2025-08-07 RX ORDER — RIVAROXABAN 20 MG/1
20 TABLET, FILM COATED ORAL
Qty: 90 TABLET | Refills: 2 | Status: SHIPPED | OUTPATIENT
Start: 2025-08-07

## 2025-08-20 DIAGNOSIS — I82.512 CHRONIC DEEP VEIN THROMBOSIS (DVT) OF FEMORAL VEIN OF LEFT LOWER EXTREMITY: ICD-10-CM

## 2025-09-04 ENCOUNTER — OFFICE VISIT (OUTPATIENT)
Facility: CLINIC | Age: 55
End: 2025-09-04
Payer: COMMERCIAL

## 2025-09-04 VITALS
TEMPERATURE: 99 F | RESPIRATION RATE: 18 BRPM | HEART RATE: 85 BPM | HEIGHT: 67 IN | BODY MASS INDEX: 27.65 KG/M2 | WEIGHT: 176.19 LBS | OXYGEN SATURATION: 98 %

## 2025-09-04 DIAGNOSIS — I82.512 CHRONIC DEEP VEIN THROMBOSIS (DVT) OF FEMORAL VEIN OF LEFT LOWER EXTREMITY: Primary | ICD-10-CM

## 2025-09-04 DIAGNOSIS — D68.59 HYPERCOAGULATION SYNDROME: ICD-10-CM

## 2025-09-04 PROCEDURE — 99999 PR PBB SHADOW E&M-EST. PATIENT-LVL IV: CPT | Mod: PBBFAC,,,

## (undated) DEVICE — CANNULA SUPERSOFT CO2 7FT

## (undated) DEVICE — SUTURE VICRYL 4-0 RB-1 27 J214H

## (undated) DEVICE — CUFF TOURNIQUET 18DUAL PRT 5921-218-235

## (undated) DEVICE — PAD ALCOHOL PREP LG STRL 2PLY

## (undated) DEVICE — ADHESIVE MASTISOL VIAL 0523-48

## (undated) DEVICE — SYR LUER-LOCK STERILE 5ML

## (undated) DEVICE — GLOVE SURG ULTRA TOUCH 7.5

## (undated) DEVICE — TOWEL OR DISP STRL BLUE 4/PK

## (undated) DEVICE — SUTURE VICRYL 3-0 RB-1 27 J215H

## (undated) DEVICE — APPLICATOR CHLORAPREP CLR 10.5

## (undated) DEVICE — STERISTRIP 1/2 R1547

## (undated) DEVICE — BANDAGE KERLIX   441106

## (undated) DEVICE — SPONGE GAUZE 10S 4X4  442214

## (undated) DEVICE — NDL FLTR 5MCRN BLNT TIP 18GX1

## (undated) DEVICE — NEEDLE SAFETY ECLIPSE 25G 1-1/2IN 305767

## (undated) DEVICE — STRAP OR TABLE 5IN X 72IN

## (undated) DEVICE — SUTURE PROLENE 4-0 PS-2 18 8682H

## (undated) DEVICE — MARKER SKIN RULER STERILE

## (undated) DEVICE — KIT NERVE BLOCK PREP BAPTIST

## (undated) DEVICE — GLOVE SENSICARE PI SURG 7.5

## (undated) DEVICE — TRAY LOWER EXTREMITY  SMHS029-05

## (undated) DEVICE — SOLUTION IRRI NS BOTTLE 1000ML R5200-01

## (undated) DEVICE — SYR LUER-LOCK STERILE 3ML

## (undated) DEVICE — SHOE POST OP MALE MEDIUM

## (undated) DEVICE — BANDAGE ACE STERILE 4 REB3114

## (undated) DEVICE — BANDAGE ESMARK 4X9 55514

## (undated) DEVICE — NDL TUOHY EPIDURAL 20G X 3.5

## (undated) DEVICE — SYRINGE 10ML 302995

## (undated) DEVICE — NDL HYPODERMIC BLUNT 18G 1.5IN

## (undated) DEVICE — GLOVE BIOGEL PI ULTRA TOUCH GRAY SZ7.5

## (undated) DEVICE — PAD BOVIE ADULT

## (undated) DEVICE — NDL SPINAL 25GX3.5 SPINOCAN

## (undated) DEVICE — DRESSING LEUKOPLAST FLEX 1X3IN